# Patient Record
Sex: MALE | Race: OTHER | HISPANIC OR LATINO | URBAN - METROPOLITAN AREA
[De-identification: names, ages, dates, MRNs, and addresses within clinical notes are randomized per-mention and may not be internally consistent; named-entity substitution may affect disease eponyms.]

---

## 2018-02-10 ENCOUNTER — EMERGENCY (EMERGENCY)
Facility: HOSPITAL | Age: 33
LOS: 1 days | Discharge: HOME | End: 2018-02-10
Attending: EMERGENCY MEDICINE | Admitting: EMERGENCY MEDICINE
Payer: MEDICAID

## 2018-02-10 VITALS
WEIGHT: 179.9 LBS | OXYGEN SATURATION: 96 % | SYSTOLIC BLOOD PRESSURE: 131 MMHG | HEART RATE: 101 BPM | DIASTOLIC BLOOD PRESSURE: 75 MMHG | TEMPERATURE: 101 F | HEIGHT: 64 IN | RESPIRATION RATE: 20 BRPM

## 2018-02-10 VITALS
OXYGEN SATURATION: 99 % | SYSTOLIC BLOOD PRESSURE: 120 MMHG | RESPIRATION RATE: 18 BRPM | HEART RATE: 89 BPM | TEMPERATURE: 100 F | DIASTOLIC BLOOD PRESSURE: 71 MMHG

## 2018-02-10 DIAGNOSIS — R51 HEADACHE: ICD-10-CM

## 2018-02-10 DIAGNOSIS — R11.2 NAUSEA WITH VOMITING, UNSPECIFIED: ICD-10-CM

## 2018-02-10 DIAGNOSIS — R10.13 EPIGASTRIC PAIN: ICD-10-CM

## 2018-02-10 DIAGNOSIS — R10.9 UNSPECIFIED ABDOMINAL PAIN: ICD-10-CM

## 2018-02-10 LAB
ALBUMIN SERPL ELPH-MCNC: 4.4 G/DL — SIGNIFICANT CHANGE UP (ref 3.3–5)
ALP SERPL-CCNC: 90 U/L — SIGNIFICANT CHANGE UP (ref 40–120)
ALT FLD-CCNC: 35 U/L — SIGNIFICANT CHANGE UP (ref 10–45)
ANION GAP SERPL CALC-SCNC: 13 MMOL/L — SIGNIFICANT CHANGE UP (ref 5–17)
APPEARANCE UR: CLEAR — SIGNIFICANT CHANGE UP
APTT BLD: 31.6 SEC — SIGNIFICANT CHANGE UP (ref 27.5–37.4)
AST SERPL-CCNC: 40 U/L — SIGNIFICANT CHANGE UP (ref 10–40)
BACTERIA # UR AUTO: PRESENT /HPF
BASOPHILS NFR BLD AUTO: 0.1 % — SIGNIFICANT CHANGE UP (ref 0–2)
BILIRUB SERPL-MCNC: 0.9 MG/DL — SIGNIFICANT CHANGE UP (ref 0.2–1.2)
BILIRUB UR-MCNC: NEGATIVE — SIGNIFICANT CHANGE UP
BUN SERPL-MCNC: 13 MG/DL — SIGNIFICANT CHANGE UP (ref 7–23)
CALCIUM SERPL-MCNC: 9 MG/DL — SIGNIFICANT CHANGE UP (ref 8.4–10.5)
CHLORIDE SERPL-SCNC: 99 MMOL/L — SIGNIFICANT CHANGE UP (ref 96–108)
CO2 SERPL-SCNC: 23 MMOL/L — SIGNIFICANT CHANGE UP (ref 22–31)
COLOR SPEC: YELLOW — SIGNIFICANT CHANGE UP
CREAT SERPL-MCNC: 1.06 MG/DL — SIGNIFICANT CHANGE UP (ref 0.5–1.3)
DIFF PNL FLD: (no result)
GLUCOSE SERPL-MCNC: 148 MG/DL — HIGH (ref 70–99)
GLUCOSE UR QL: NEGATIVE — SIGNIFICANT CHANGE UP
HCT VFR BLD CALC: 42.3 % — SIGNIFICANT CHANGE UP (ref 39–50)
HGB BLD-MCNC: 15 G/DL — SIGNIFICANT CHANGE UP (ref 13–17)
INR BLD: 1.26 — HIGH (ref 0.88–1.16)
KETONES UR-MCNC: NEGATIVE — SIGNIFICANT CHANGE UP
LACTATE SERPL-SCNC: 1 MMOL/L — SIGNIFICANT CHANGE UP (ref 0.5–2)
LEUKOCYTE ESTERASE UR-ACNC: NEGATIVE — SIGNIFICANT CHANGE UP
LIDOCAIN IGE QN: 18 U/L — SIGNIFICANT CHANGE UP (ref 7–60)
LYMPHOCYTES # BLD AUTO: 4.8 % — LOW (ref 13–44)
MCHC RBC-ENTMCNC: 31.8 PG — SIGNIFICANT CHANGE UP (ref 27–34)
MCHC RBC-ENTMCNC: 35.5 G/DL — SIGNIFICANT CHANGE UP (ref 32–36)
MCV RBC AUTO: 89.8 FL — SIGNIFICANT CHANGE UP (ref 80–100)
MONOCYTES NFR BLD AUTO: 2.8 % — SIGNIFICANT CHANGE UP (ref 2–14)
NEUTROPHILS NFR BLD AUTO: 92.3 % — HIGH (ref 43–77)
NITRITE UR-MCNC: NEGATIVE — SIGNIFICANT CHANGE UP
PH UR: 7 — SIGNIFICANT CHANGE UP (ref 5–8)
PLATELET # BLD AUTO: 185 K/UL — SIGNIFICANT CHANGE UP (ref 150–400)
POTASSIUM SERPL-MCNC: 3.7 MMOL/L — SIGNIFICANT CHANGE UP (ref 3.5–5.3)
POTASSIUM SERPL-SCNC: 3.7 MMOL/L — SIGNIFICANT CHANGE UP (ref 3.5–5.3)
PROT SERPL-MCNC: 7.9 G/DL — SIGNIFICANT CHANGE UP (ref 6–8.3)
PROT UR-MCNC: 30 MG/DL
PROTHROM AB SERPL-ACNC: 14 SEC — HIGH (ref 9.8–12.7)
RBC # BLD: 4.71 M/UL — SIGNIFICANT CHANGE UP (ref 4.2–5.8)
RBC # FLD: 12.8 % — SIGNIFICANT CHANGE UP (ref 10.3–16.9)
RBC CASTS # UR COMP ASSIST: (no result) /HPF
SODIUM SERPL-SCNC: 135 MMOL/L — SIGNIFICANT CHANGE UP (ref 135–145)
SP GR SPEC: 1.01 — SIGNIFICANT CHANGE UP (ref 1–1.03)
UROBILINOGEN FLD QL: 1 E.U./DL — SIGNIFICANT CHANGE UP
WBC # BLD: 17.4 K/UL — HIGH (ref 3.8–10.5)
WBC # FLD AUTO: 17.4 K/UL — HIGH (ref 3.8–10.5)
WBC UR QL: < 5 /HPF — SIGNIFICANT CHANGE UP

## 2018-02-10 PROCEDURE — 71046 X-RAY EXAM CHEST 2 VIEWS: CPT

## 2018-02-10 PROCEDURE — 99285 EMERGENCY DEPT VISIT HI MDM: CPT

## 2018-02-10 PROCEDURE — 83690 ASSAY OF LIPASE: CPT

## 2018-02-10 PROCEDURE — 81001 URINALYSIS AUTO W/SCOPE: CPT

## 2018-02-10 PROCEDURE — 85730 THROMBOPLASTIN TIME PARTIAL: CPT

## 2018-02-10 PROCEDURE — 83605 ASSAY OF LACTIC ACID: CPT

## 2018-02-10 PROCEDURE — 99284 EMERGENCY DEPT VISIT MOD MDM: CPT | Mod: 25

## 2018-02-10 PROCEDURE — 71046 X-RAY EXAM CHEST 2 VIEWS: CPT | Mod: 26

## 2018-02-10 PROCEDURE — 36415 COLL VENOUS BLD VENIPUNCTURE: CPT

## 2018-02-10 PROCEDURE — 87040 BLOOD CULTURE FOR BACTERIA: CPT

## 2018-02-10 PROCEDURE — 76705 ECHO EXAM OF ABDOMEN: CPT

## 2018-02-10 PROCEDURE — 85025 COMPLETE CBC W/AUTO DIFF WBC: CPT

## 2018-02-10 PROCEDURE — 74177 CT ABD & PELVIS W/CONTRAST: CPT | Mod: 26

## 2018-02-10 PROCEDURE — 80053 COMPREHEN METABOLIC PANEL: CPT

## 2018-02-10 PROCEDURE — 74177 CT ABD & PELVIS W/CONTRAST: CPT

## 2018-02-10 PROCEDURE — 96375 TX/PRO/DX INJ NEW DRUG ADDON: CPT

## 2018-02-10 PROCEDURE — 85610 PROTHROMBIN TIME: CPT

## 2018-02-10 PROCEDURE — 76705 ECHO EXAM OF ABDOMEN: CPT | Mod: 26

## 2018-02-10 PROCEDURE — 96374 THER/PROPH/DIAG INJ IV PUSH: CPT | Mod: XU

## 2018-02-10 PROCEDURE — 87086 URINE CULTURE/COLONY COUNT: CPT

## 2018-02-10 RX ORDER — MORPHINE SULFATE 50 MG/1
2 CAPSULE, EXTENDED RELEASE ORAL ONCE
Qty: 0 | Refills: 0 | Status: DISCONTINUED | OUTPATIENT
Start: 2018-02-10 | End: 2018-02-10

## 2018-02-10 RX ORDER — IBUPROFEN 200 MG
1 TABLET ORAL
Qty: 30 | Refills: 0
Start: 2018-02-10 | End: 2018-02-19

## 2018-02-10 RX ORDER — IOHEXOL 300 MG/ML
50 INJECTION, SOLUTION INTRAVENOUS ONCE
Qty: 0 | Refills: 0 | Status: COMPLETED | OUTPATIENT
Start: 2018-02-10 | End: 2018-02-10

## 2018-02-10 RX ORDER — SODIUM CHLORIDE 9 MG/ML
1000 INJECTION INTRAMUSCULAR; INTRAVENOUS; SUBCUTANEOUS ONCE
Qty: 0 | Refills: 0 | Status: COMPLETED | OUTPATIENT
Start: 2018-02-10 | End: 2018-02-10

## 2018-02-10 RX ORDER — ONDANSETRON 8 MG/1
4 TABLET, FILM COATED ORAL ONCE
Qty: 0 | Refills: 0 | Status: COMPLETED | OUTPATIENT
Start: 2018-02-10 | End: 2018-02-10

## 2018-02-10 RX ADMIN — ONDANSETRON 4 MILLIGRAM(S): 8 TABLET, FILM COATED ORAL at 09:16

## 2018-02-10 RX ADMIN — MORPHINE SULFATE 2 MILLIGRAM(S): 50 CAPSULE, EXTENDED RELEASE ORAL at 09:17

## 2018-02-10 RX ADMIN — SODIUM CHLORIDE 2000 MILLILITER(S): 9 INJECTION INTRAMUSCULAR; INTRAVENOUS; SUBCUTANEOUS at 09:14

## 2018-02-10 RX ADMIN — SODIUM CHLORIDE 2000 MILLILITER(S): 9 INJECTION INTRAMUSCULAR; INTRAVENOUS; SUBCUTANEOUS at 09:49

## 2018-02-10 RX ADMIN — MORPHINE SULFATE 2 MILLIGRAM(S): 50 CAPSULE, EXTENDED RELEASE ORAL at 09:35

## 2018-02-10 RX ADMIN — IOHEXOL 50 MILLILITER(S): 300 INJECTION, SOLUTION INTRAVENOUS at 12:23

## 2018-02-10 NOTE — ED PROVIDER NOTE - MEDICAL DECISION MAKING DETAILS
abd pain, n/v and ha ? viral. temp 100.8 in ED. neuro exam intact. labs noted. elevagted WBC. u/s done and no acute pathology. lactic 1.0. fever improved with fluids. ct scan done and no acute pathology. fluids, rest, motrin and f/u with pmd

## 2018-02-10 NOTE — ED ADULT NURSE NOTE - OBJECTIVE STATEMENT
Pt presents to ED A&Ox3 c/o mid adnominal pain, back pain and headache since last night. pt reports 1 episode of vomiting today and subjective fever. denies diarrhea, dysuria, burning urination, cp/sob.

## 2018-02-10 NOTE — ED ADULT NURSE NOTE - CHPI ED SYMPTOMS NEG
no diarrhea/no chills/no hematuria/no dysuria/no blood in stool/no burning urination/no abdominal distension

## 2018-02-10 NOTE — ED PROVIDER NOTE - GASTROINTESTINAL, MLM
Abdomen soft, mild tenderness to epigastric region. no guarding. no rebound. no cva tenderness. no distention.

## 2018-02-10 NOTE — ED PROVIDER NOTE - OBJECTIVE STATEMENT
31 y/o male with no sig PMHx c/o abd pain, n/v and ha. pt states sx's began last night. pt notes fever upon arrival.  Pt states vomited x 1 in cab ride to hospital. no visual changes. no neck or back pain. no sore throat, cough. no diarrhea. pt notes pain to upper admomen dull achy constant pain and radiates to back. no urinary sx's. no recent travel. no further complaints.

## 2018-02-10 NOTE — ED ADULT TRIAGE NOTE - CHIEF COMPLAINT QUOTE
Patient c/o abdominal pain , nausea ,vomiting and lower back pain since last night . Denies any dysuria .

## 2018-02-10 NOTE — ED PROVIDER NOTE - ATTENDING CONTRIBUTION TO CARE
31 y/o male with no sig PMHx c/o abd pain, n/v and ha. pt states sx's began last night. pt notes fever upon arrival.  Pt states vomited x 1 in cab ride to hospital. no visual changes. no neck or back pain. no sore throat, cough. no diarrhea. pt notes pain to upper admomen dull achy constant pain and radiates to back. no urinary sx's. no recent travel. no further complaints.  PE mild diffuse tenderness, labs and ct done, febrile to 100.8, elevated wbc count, us neg, ct no acute pathology most likely viral syndrome causing symptoms, fever, elevated wbc count - recommend supportive care.

## 2018-02-11 LAB
CULTURE RESULTS: SIGNIFICANT CHANGE UP
SPECIMEN SOURCE: SIGNIFICANT CHANGE UP

## 2018-02-15 LAB
CULTURE RESULTS: SIGNIFICANT CHANGE UP
CULTURE RESULTS: SIGNIFICANT CHANGE UP
SPECIMEN SOURCE: SIGNIFICANT CHANGE UP
SPECIMEN SOURCE: SIGNIFICANT CHANGE UP

## 2019-02-25 ENCOUNTER — EMERGENCY (EMERGENCY)
Facility: HOSPITAL | Age: 34
LOS: 1 days | Discharge: HOME | End: 2019-02-25
Admitting: EMERGENCY MEDICINE
Payer: MEDICAID

## 2019-02-25 VITALS
WEIGHT: 184.97 LBS | OXYGEN SATURATION: 97 % | RESPIRATION RATE: 17 BRPM | SYSTOLIC BLOOD PRESSURE: 134 MMHG | DIASTOLIC BLOOD PRESSURE: 91 MMHG | HEART RATE: 68 BPM | TEMPERATURE: 98 F

## 2019-02-25 DIAGNOSIS — I83.008 VARICOSE VEINS OF UNSPECIFIED LOWER EXTREMITY WITH ULCER OTHER PART OF LOWER LEG: ICD-10-CM

## 2019-02-25 DIAGNOSIS — Z79.1 LONG TERM (CURRENT) USE OF NON-STEROIDAL ANTI-INFLAMMATORIES (NSAID): ICD-10-CM

## 2019-02-25 DIAGNOSIS — Z79.899 OTHER LONG TERM (CURRENT) DRUG THERAPY: ICD-10-CM

## 2019-02-25 DIAGNOSIS — M79.662 PAIN IN LEFT LOWER LEG: ICD-10-CM

## 2019-02-25 PROCEDURE — 99284 EMERGENCY DEPT VISIT MOD MDM: CPT

## 2019-02-25 PROCEDURE — 93971 EXTREMITY STUDY: CPT | Mod: 26,LT

## 2019-02-25 PROCEDURE — 93971 EXTREMITY STUDY: CPT

## 2019-02-25 NOTE — ED ADULT NURSE NOTE - CHPI ED NUR SYMPTOMS NEG
no vomiting/no fever/no drainage/no chills/no bleeding at site/no blood in mucus/no purulent drainage/no rectal pain/no redness

## 2019-02-25 NOTE — ED PROVIDER NOTE - NSFOLLOWUPINSTRUCTIONS_ED_ALL_ED_FT
Use la crema antifúngica dos veces al día kasey 2 semanas.    Eleve christine piernas siempre que sea posible.  Use medias de compresión mientras trabaja para ayudar a mejorar la circulación en las piernas.    Seguimiento en la [facility]. Si tiene alguna dificultad para obtener kitty izabella, llame al Coordinador de referencias de ED [phone] o puede hacer un seguimiento en kitty clínica de bladimir y hospitales de la [facility].  __________________________________________________________  Úlcera varicosa  Venous Ulcer  Kitty úlcera varicosa es kitty llaga superficial en la parte baja de la pierna cuya causa es la awilda circulación venosa. Esta afección suele conocerse paula úlcera por insuficiencia venosa.    Las venas tienen válvulas que ayudan a que la erica retorne al corazón. Si estas válvulas no funcionan correctamente, el flujo sanguíneo puede retroceder y regresar a las venas cerca de la piel. Cuando esto ocurre, la erica puede depositarse en la parte baja de las piernas. Luego, la erica puede escaparse de las venas e irritar la piel. [place] puede causar kitty lesión en la piel que se convierte en kitty úlcera varicosa.    ImageLas úlceras varicosas son el tipo más frecuente de úlceras de la parte inferior de la pierna. Estas pueden aparecer en kitty o en ambas piernas. La jennifer donde esta afección se manifiesta más comúnmente es alrededor de los tobillos. Kitty úlcera varicosa puede durar mucho tiempo (úlcera crónica) o reaparecer reiteradamente (úlcera recurrente).    ¿Cuáles son las causas?  Cualquier trastorno que cause awilda circulación en las piernas puede derivar en la formación de kitty úlcera varicosa.    ¿Qué incrementa el riesgo?  Es más probable que esta afección se manifieste en:    Las personas mayores de 65 años.  Las personas con sobrepeso.  Las personas sedentarias.  Las personas que ya tuvieron kitty úlcera en la pierna.  Las personas que tienen coágulos de erica en las venas en la parte inferior de las piernas (trombosis venosa profunda).  Las personas a las que se les inflaman las venas de las piernas (flebitis).  Las mujeres que tuvieron un hijo.  Las personas que fuman.    ¿Cuáles son los signos o los síntomas?  El síntoma más frecuente de esta afección es kitty llaga abierta cerca del tobillo. Otros síntomas pueden incluir lo siguiente:    Hinchazón.  Engrosamiento de la piel.  Líquido que supura de la úlcera.  Hemorragia.  Picazón.  Dolor e hinchazón que empeoran al estar de pie y que se alivian al elevar la pierna.  Piel manchada.  Oscurecimiento de la piel.    ¿Cómo se diagnostica?  El médico puede sospechar la presencia de kitty úlcera varicosa en función de la historia clínica y de los factores de riesgo. El médico le revisará la piel de las piernas. Se pueden hacer otros estudios para obtener más información sobre la úlcera y determinar la mejor forma de tratarla. Podrán solicitarle otros estudios, por ejemplo:    Determinación de la presión arterial en los brazos y las piernas.  Utilización de ondas de kevin (ecografía) para medir el flujo sanguíneo en las venas de las piernas.    ¿Cómo se trata?  Es posible que tenga que probar varios tipos de tratamientos diferentes para lograr la cicatrización de la úlcera varicosa. La cicatrización puede demorar mucho tiempo. El tratamiento puede incluir lo siguiente:    Mantener la pierna levantada elevada.  Usar un tipo de vendaje o de medias para comprimir las venas de las piernas (tratamiento de compresión). Es poco probable que las heridas varicosas se cicatricen o se mantengan cerradas sin compresión.  Jignesh medicamentos para mejorar el flujo sanguíneo.  Jignesh antibióticos para tratar la infección.  Limpiar la úlcera y eliminar el tejido muerto de la herida (limpieza quirúrgica).  Colocar varios tipos de vendas (vendajes) medicinales en la úlcera. [place] ayuda a que la úlcera cicatrice y a evitar las infecciones.    A veces, es necesario someterse a kitty cirugía para cerrar la herida con un trozo de piel que se paulo de otra parte del cuerpo (injerto). Si otros tratamientos no resultan eficaces o si la úlcera es muy profunda, costa vez tenga que someterse a kitty cirugía.    Siga estas indicaciones en madera casa:  Cuidado de la herida     Siga las indicaciones del médico acerca de lo siguiente:    Cómo cuidar de la herida.  Cómo y cuándo cambiar las vendas (vendaje).  Cuándo retirar el vendaje. Si el vendaje está seco y adherido a la pierna cuando intenta quitarlo, humedézcalo o mójelo con solución salina o agua, de modo que se pueda retirar sin dañar la piel o el tejido de la herida.    Controle la herida todos los días para detectar signos de infección. Pídale a un médico que lo evelio si no puede hacerlo usted mismo. Esté atento a los siguientes signos:    Aumento del enrojecimiento, la hinchazón o el dolor.  Mayor presencia de líquido o erica.  Pus, sensación de calor o mal olor.    Medicamentos     [place] los medicamentos de venta kaylen y los recetados solamente paula se lo haya indicado el médico.  Si le recetaron un antibiótico, tómelo o aplíqueselo paula se lo haya indicado el médico. No deje de jignesh o de usar el antibiótico aunque la afección mejore.  Actividad     No permanezca de pie o sentado en kitty misma posición kasey mucho tiempo. Descanse con las piernas levantadas kasey el día. En lo posible, eleve la pierna por arriba del nivel del corazón, kasey 30 minutos, 3 a 4 veces por día.  No se siente con las piernas cruzadas.  Camine con frecuencia para aumentar el flujo de erica a las piernas. Pregúntele al médico qué nivel de actividad es seguro para usted.  Si realiza un viaje sunil en automóvil o en avión, párese y camine por lo menos kitty vez cada dos horas o con la frecuencia que le recomiende el médico. Pregúntele al médico si debe jignesh aspirinas antes de realizar viajes largos.  Instrucciones generales     Use medias elásticas, medias de compresión o medias de descanso paula se lo haya indicado el médico. [place] es muy importante.  Eleve el pie de la cama paula se lo haya indicado el médico.  No fume.  ImageConcurra a todas las visitas de seguimiento paula se lo haya indicado el médico. [place] es importante.  Comuníquese con un médico si:  Tiene fiebre.  La úlcera se está agrandando o no cicatriza.  El dolor empeora.  Aumenta el enrojecimiento o la hinchazón alrededor de la úlcera.  Emana más líquido, erica o pus de la úlcera después de que el médico la haya limpiado.  Tiene sensación de calor en la úlcera o esta tiene mal olor.  Esta información no tiene paula fin reemplazar el consejo del médico. Asegúrese de hacerle al médico cualquier pregunta que tenga.

## 2019-02-25 NOTE — ED PROVIDER NOTE - NS ED ROS FT
CONST:  no fever/chills  NEURO: no tingling/numbness/weakness  CARDIO: no chest pain/palpitations  PULM: no dyspnea/cough/hemoptysis  GI: no abdominal pain, nausea or vomiting

## 2019-02-25 NOTE — ED PROVIDER NOTE - CLINICAL SUMMARY MEDICAL DECISION MAKING FREE TEXT BOX
Suspect venous stasis ulcer of LLE due to long hours standing at work.  Consider element of fungal infection, but no cellulitis.  Low clinical suspicion for DVT, and US negative for DVT.  Will tx with antifungal, leg elevation and compression stockings.  Primary care clinic follow up.

## 2019-02-25 NOTE — ED ADULT NURSE NOTE - OBJECTIVE STATEMENT
Pt w/o significant PMH presents to ED today c/o 5/10 LLE pain x1 month.  Pt states pain is worse at night and when standing.  Pt has wound on medial malleolus of LLE that is scabbed over that he elicits presented x1 week ago.  Pt denies injury, fall, N/V, fever/chills or redness/itching at wound site.  Pt states he has been treating his pain w/ PO tylenol w/o success.  Pt denies SOB or long air/train travel, but does elicit he stands approx 6-8 at a time at work.  Pt is pending eval by [facility].

## 2019-02-25 NOTE — ED PROVIDER NOTE - NSFOLLOWUPCLINICS_GEN_ALL_ED_FT
[facility]  Family Medicine  [address] [place] [address]  Phone: [phone]  Fax:     [address]  Family Medicine  [address] [place] [address]  Phone: [phone]  Fax: [phone]  Follow Up Time:

## 2019-02-25 NOTE — ED PROVIDER NOTE - PHYSICAL EXAMINATION
CONSTITUTIONAL: WD,WN. NAD.    SKIN: Normal color and turgor. No rash.    HEAD: NC/AT.  EYES: Conjunctiva clear. EOMI. PERRL.    ENT: Airway patent, OP without erythema, tonsillar swelling or exudate; uvula midline without swelling. Nasal mucosa clear, no rhinorrhea.   RESPIRATORY:  Breathing non-labored. No retractions or accessory muscle use.  Lungs CTA bilat.  CARDIOVASCULAR:  RRR, S1S2. No M/R/G.      GI:  Abdomen soft, nontender.    MSK: 2 x 2 cm eschar to anteromedial aspect of right lower leg above the ankle; second 1 cm lesion to anterior lower leg a few cm away.  Surrounding brawny discoloration; no erythema/warmth.  Strong DP and PT pulses. No joint swelling or ROM limitation.  NEURO: Alert and oriented; CN II-XII grossly intact. Speech clear. 5/5 strength in all extremities.  Normal balance and gait.

## 2019-02-25 NOTE — ED ADULT NURSE NOTE - NSIMPLEMENTINTERV_GEN_ALL_ED
Implemented All Universal Safety Interventions:  [place] to call system. Call bell, personal items and telephone within reach. Instruct patient to call for assistance. Room bathroom lighting operational. Non-slip footwear when patient is off stretcher. Physically safe environment: no spills, clutter or unnecessary equipment. Stretcher in lowest position, wheels locked, appropriate side rails in place.

## 2019-02-25 NOTE — ED PROVIDER NOTE - OBJECTIVE STATEMENT
pt is 32 yo m without significant PMHx c/o painful skin lesion to left lower leg for the past month.  He says he is on his feet multiple hours daily at work.  Pain increases with walking and associated with mild swelling.  He tried compression socks for a week without relief.  No hx of DVT/PE.  No immobilization, no hemptysis/cough/CP/palpitations. No fever.    PCP: none  PMHx none  PSHx none  Meds none  NKA

## 2019-03-01 PROBLEM — Z00.00 ENCOUNTER FOR PREVENTIVE HEALTH EXAMINATION: Status: ACTIVE | Noted: 2019-03-01

## 2019-03-14 ENCOUNTER — APPOINTMENT (OUTPATIENT)
Dept: INTERNAL MEDICINE | Facility: CLINIC | Age: 34
End: 2019-03-14

## 2019-03-18 ENCOUNTER — EMERGENCY (EMERGENCY)
Facility: HOSPITAL | Age: 34
LOS: 1 days | Discharge: HOME | End: 2019-03-18
Admitting: EMERGENCY MEDICINE
Payer: MEDICAID

## 2019-03-18 VITALS
SYSTOLIC BLOOD PRESSURE: 143 MMHG | HEART RATE: 79 BPM | TEMPERATURE: 98 F | OXYGEN SATURATION: 98 % | DIASTOLIC BLOOD PRESSURE: 91 MMHG | RESPIRATION RATE: 16 BRPM

## 2019-03-18 LAB
ALBUMIN SERPL ELPH-MCNC: 4.2 G/DL — SIGNIFICANT CHANGE UP (ref 3.3–5)
ALP SERPL-CCNC: 91 U/L — SIGNIFICANT CHANGE UP (ref 40–120)
ALT FLD-CCNC: 25 U/L — SIGNIFICANT CHANGE UP (ref 10–45)
ANION GAP SERPL CALC-SCNC: 11 MMOL/L — SIGNIFICANT CHANGE UP (ref 5–17)
APTT BLD: 39.1 SEC — HIGH (ref 27.5–36.3)
AST SERPL-CCNC: 22 U/L — SIGNIFICANT CHANGE UP (ref 10–40)
BASOPHILS # BLD AUTO: 0.03 K/UL — SIGNIFICANT CHANGE UP (ref 0–0.2)
BASOPHILS NFR BLD AUTO: 0.4 % — SIGNIFICANT CHANGE UP (ref 0–2)
BILIRUB SERPL-MCNC: 0.2 MG/DL — SIGNIFICANT CHANGE UP (ref 0.2–1.2)
BUN SERPL-MCNC: 19 MG/DL — SIGNIFICANT CHANGE UP (ref 7–23)
CALCIUM SERPL-MCNC: 9.3 MG/DL — SIGNIFICANT CHANGE UP (ref 8.4–10.5)
CHLORIDE SERPL-SCNC: 107 MMOL/L — SIGNIFICANT CHANGE UP (ref 96–108)
CO2 SERPL-SCNC: 23 MMOL/L — SIGNIFICANT CHANGE UP (ref 22–31)
CREAT SERPL-MCNC: 0.98 MG/DL — SIGNIFICANT CHANGE UP (ref 0.5–1.3)
EOSINOPHIL # BLD AUTO: 0.12 K/UL — SIGNIFICANT CHANGE UP (ref 0–0.5)
EOSINOPHIL NFR BLD AUTO: 1.7 % — SIGNIFICANT CHANGE UP (ref 0–6)
GLUCOSE SERPL-MCNC: 100 MG/DL — HIGH (ref 70–99)
HCT VFR BLD CALC: 43 % — SIGNIFICANT CHANGE UP (ref 39–50)
HGB BLD-MCNC: 15.6 G/DL — SIGNIFICANT CHANGE UP (ref 13–17)
IMM GRANULOCYTES NFR BLD AUTO: 0.1 % — SIGNIFICANT CHANGE UP (ref 0–1.5)
INR BLD: 1.04 — SIGNIFICANT CHANGE UP (ref 0.88–1.16)
LYMPHOCYTES # BLD AUTO: 2.8 K/UL — SIGNIFICANT CHANGE UP (ref 1–3.3)
LYMPHOCYTES # BLD AUTO: 38.6 % — SIGNIFICANT CHANGE UP (ref 13–44)
MCHC RBC-ENTMCNC: 33.3 PG — SIGNIFICANT CHANGE UP (ref 27–34)
MCHC RBC-ENTMCNC: 36.3 GM/DL — HIGH (ref 32–36)
MCV RBC AUTO: 91.9 FL — SIGNIFICANT CHANGE UP (ref 80–100)
MONOCYTES # BLD AUTO: 0.46 K/UL — SIGNIFICANT CHANGE UP (ref 0–0.9)
MONOCYTES NFR BLD AUTO: 6.3 % — SIGNIFICANT CHANGE UP (ref 2–14)
NEUTROPHILS # BLD AUTO: 3.84 K/UL — SIGNIFICANT CHANGE UP (ref 1.8–7.4)
NEUTROPHILS NFR BLD AUTO: 52.9 % — SIGNIFICANT CHANGE UP (ref 43–77)
NRBC # BLD: 0 /100 WBCS — SIGNIFICANT CHANGE UP (ref 0–0)
PLATELET # BLD AUTO: 196 K/UL — SIGNIFICANT CHANGE UP (ref 150–400)
POTASSIUM SERPL-MCNC: 3.8 MMOL/L — SIGNIFICANT CHANGE UP (ref 3.5–5.3)
POTASSIUM SERPL-SCNC: 3.8 MMOL/L — SIGNIFICANT CHANGE UP (ref 3.5–5.3)
PROT SERPL-MCNC: 7.7 G/DL — SIGNIFICANT CHANGE UP (ref 6–8.3)
PROTHROM AB SERPL-ACNC: 11.8 SEC — SIGNIFICANT CHANGE UP (ref 10–12.9)
RBC # BLD: 4.68 M/UL — SIGNIFICANT CHANGE UP (ref 4.2–5.8)
RBC # FLD: 12.4 % — SIGNIFICANT CHANGE UP (ref 10.3–14.5)
SODIUM SERPL-SCNC: 141 MMOL/L — SIGNIFICANT CHANGE UP (ref 135–145)
WBC # BLD: 7.26 K/UL — SIGNIFICANT CHANGE UP (ref 3.8–10.5)
WBC # FLD AUTO: 7.26 K/UL — SIGNIFICANT CHANGE UP (ref 3.8–10.5)

## 2019-03-18 PROCEDURE — 80053 COMPREHEN METABOLIC PANEL: CPT

## 2019-03-18 PROCEDURE — 99284 EMERGENCY DEPT VISIT MOD MDM: CPT

## 2019-03-18 PROCEDURE — 99284 EMERGENCY DEPT VISIT MOD MDM: CPT | Mod: 25

## 2019-03-18 PROCEDURE — 96374 THER/PROPH/DIAG INJ IV PUSH: CPT

## 2019-03-18 PROCEDURE — 85610 PROTHROMBIN TIME: CPT

## 2019-03-18 PROCEDURE — 36415 COLL VENOUS BLD VENIPUNCTURE: CPT

## 2019-03-18 PROCEDURE — 73590 X-RAY EXAM OF LOWER LEG: CPT | Mod: 26,LT

## 2019-03-18 PROCEDURE — 85730 THROMBOPLASTIN TIME PARTIAL: CPT

## 2019-03-18 PROCEDURE — 73590 X-RAY EXAM OF LOWER LEG: CPT

## 2019-03-18 PROCEDURE — 85025 COMPLETE CBC W/AUTO DIFF WBC: CPT

## 2019-03-18 RX ORDER — CEPHALEXIN 500 MG
1 CAPSULE ORAL
Qty: 20 | Refills: 0
Start: 2019-03-18 | End: 2019-03-27

## 2019-03-18 RX ORDER — VANCOMYCIN HCL 1 G
1000 VIAL (EA) INTRAVENOUS ONCE
Qty: 0 | Refills: 0 | Status: COMPLETED | OUTPATIENT
Start: 2019-03-18 | End: 2019-03-18

## 2019-03-18 RX ORDER — DIPHENHYDRAMINE HCL 50 MG
50 CAPSULE ORAL ONCE
Qty: 0 | Refills: 0 | Status: COMPLETED | OUTPATIENT
Start: 2019-03-18 | End: 2019-03-18

## 2019-03-18 RX ADMIN — Medication 50 MILLIGRAM(S): at 20:58

## 2019-03-18 RX ADMIN — Medication 250 MILLIGRAM(S): at 19:01

## 2019-03-18 NOTE — ED ADULT NURSE NOTE - OBJECTIVE STATEMENT
The pt is a 32 y/o male who came in to ED for evaluation of wound check. Pt reports coming to ED for evaluation of same wound two weeks ago and given bacitracin which did not make it better.

## 2019-03-18 NOTE — ED PROVIDER NOTE - PHYSICAL EXAMINATION
4cm ulcer above left ankle medial aspect. no d/c. mild surrounding erythema and mild swelling. no collection palpable. FROM. distal NVI. + tenderness to ucler otherwise LLE non tender. no calf tenderness. no streaking.

## 2019-03-18 NOTE — ED PROVIDER NOTE - PROGRESS NOTE DETAILS
pt noted redness to face, upper back and upper chest at end for infusion. mild itching. no lip, tongue or throat swelling. no sob. lungs clear on exam. ? redmans syndrome vs allergic rxn. benadryl given and will observe. pt observed in ED. rash resolved and pt reports feeling well. no sob. will d/c home likely klaus's and not allergic rxn.

## 2019-03-18 NOTE — ED ADULT NURSE NOTE - NSIMPLEMENTINTERV_GEN_ALL_ED
Implemented All Universal Safety Interventions:  [place] to call system. Call bell, personal items and telephone within reach. Instruct patient to call for assistance. Room bathroom lighting operational. Non-slip footwear when patient is off stretcher. Physically safe environment: no spills, clutter or unnecessary equipment. Stretcher in lowest position, wheels locked, appropriate side rails in place.

## 2019-03-18 NOTE — ED PROVIDER NOTE - NSFOLLOWUPINSTRUCTIONS_ED_ALL_ED_FT
Follow up in the Emergency Department in 2 days for wound check        Cellulitis, Adult    Cellulitis is a skin infection. The infected area is usually red and tender. This condition occurs most often in the arms and lower legs. The infection can travel to the muscles, blood, and underlying tissue and become serious. It is very important to get treated for this condition.    What are the causes?  Cellulitis is caused by bacteria. The bacteria enter through a break in the skin, such as a cut, burn, insect bite, open sore, or crack.    What increases the risk?  This condition is more likely to occur in people who:    Have a weak defense system (immune system).  Have open wounds on the skin such as cuts, burns, bites, and scrapes. Bacteria can enter the body through these open wounds.  Are older.  Have diabetes.  Have a type of long-lasting (chronic) liver disease (cirrhosis) or kidney disease.  Use IV drugs.    What are the signs or symptoms?  Symptoms of this condition include:    Redness, streaking, or spotting on the skin.  Swollen area of the skin.  Tenderness or pain when an area of the skin is touched.  Warm skin.  Fever.  Chills.  Blisters.    How is this diagnosed?  This condition is diagnosed based on a medical history and physical exam. You may also have tests, including:    Blood tests.  Lab tests.  Imaging tests.    How is this treated?  Treatment for this condition may include:    Medicines, such as antibiotic medicines or antihistamines.  Supportive care, such as rest and application of cold or warm cloths (cold or warm compresses) to the skin.  Hospital care, if the condition is severe.    The infection usually gets better within 1–2 days of treatment.    Follow these instructions at home:  Take over-the-counter and prescription medicines only as told by your health care provider.  If you were prescribed an antibiotic medicine, take it as told by your health care provider. Do not stop taking the antibiotic even if you start to feel better.  Drink enough fluid to keep your urine clear or pale yellow.  Do not touch or rub the infected area.  Raise (elevate) the infected area above the level of your heart while you are sitting or lying down.  Apply warm or cold compresses to the affected area as told by your health care provider.  Keep all follow-up visits as told by your health care provider. This is important. These visits let your health care provider make sure a more serious infection is not developing.  Contact a health care provider if:  You have a fever.  Your symptoms do not improve within 1–2 days of starting treatment.  Your bone or joint underneath the infected area becomes painful after the skin has healed.  Your infection returns in the same area or another area.  You notice a swollen bump in the infected area.  You develop new symptoms.  You have a general ill feeling (malaise) with muscle aches and pains.  Get help right away if:  Your symptoms get worse.  You feel very sleepy.  You develop vomiting or diarrhea that persists.  You notice red streaks coming from the infected area.  Your red area gets larger or turns dark in color.  This information is not intended to replace advice given to you by your health care provider. Make sure you discuss any questions you have with your health care provider.    Document Released: 09/27/2006 Document Revised: 04/27/2017 Document Reviewed: 10/26/2016  [employer] Interactive Patient Education © 2019 [employer] Follow up in [facility] Emergency department in 2 days for wound check        Log Out.    test companyedex® CareNotes®     :  [facility]             CELLULITIS - AfterCare(R) Instructions(ER/ED)     Cellulitis    WHAT YOU NEED TO KNOW:    Cellulitis is a skin infection caused by bacteria. Cellulitis may go away on its own or you may need treatment. Your healthcare provider may draw a [place] around the outside edges of your cellulitis. If your cellulitis spreads, your healthcare provider will see it outside of the [place]. Cellulitis          DISCHARGE INSTRUCTIONS:    Call 911 if:     You have sudden trouble breathing or chest pain.        Return to the emergency department if:     Your wound gets larger and more painful.       You feel a crackling under your skin when you touch it.      You have purple dots or bumps on your skin, or you see bleeding under your skin.      You have new swelling and pain in your legs.      The red, warm, swollen area gets larger.      You see red streaks coming from the infected area.    Contact your healthcare provider if:     You have a fever.      Your fever or pain does not go away or gets worse.      The area does not get smaller after 2 days of antibiotics.      Your skin is flaking or peeling off.      You have questions or concerns about your condition or care.    Medicines:     Antibiotics help treat the bacterial infection.       NSAIDs, such as ibuprofen, help decrease swelling, pain, and fever. NSAIDs can cause stomach bleeding or kidney problems in certain people. If you take blood thinner medicine, always ask if NSAIDs are safe for you. Always read the medicine label and follow directions. Do not give these medicines to children under 6 months of age without direction from your child's healthcare provider.      Acetaminophen decreases pain and fever. It is available without a doctor's order. Ask how much to take and how often to take it. Follow directions. Read the labels of all other medicines you are using to see if they also contain acetaminophen, or ask your doctor or pharmacist. Acetaminophen can cause liver damage if not taken correctly. Do not use more than 4 grams (4,000 milligrams) total of acetaminophen in one day.       Take your medicine as directed. Contact your healthcare provider if you think your medicine is not helping or if you have side effects. Tell him or her if you are allergic to any medicine. Keep a list of the medicines, vitamins, and herbs you take. Include the amounts, and when and why you take them. Bring the list or the pill bottles to follow-up visits. Carry your medicine list with you in case of an emergency.    Self-care:     Elevate the area above the level of your heart as often as you can. This will help decrease swelling and pain. Prop the area on pillows or blankets to keep it elevated comfortably.       Clean the area daily until the wound scabs over. Gently wash the area with soap and water. Pat dry. Use dressings as directed.       Place cool or warm, wet cloths on the area as directed. Use clean cloths and clean water. Leave it on the area until the cloth is room temperature. Pat the area dry with a clean, dry cloth. The cloths may help decrease pain.     Prevent cellulitis:     Do not scratch bug bites or areas of injury. You increase your risk for cellulitis by scratching these areas.       Do not share personal items, such as towels, clothing, and razors.       Clean exercise equipment with germ-killing  before and after you use it.      Wash your hands often. Use soap and water. Wash your hands after you use the bathroom, change a child's diapers, or sneeze. Wash your hands before you prepare or eat food. Use lotion to prevent dry, cracked skin. Handwashing           Wear pressure stockings as directed. You may be told to wear the stockings if you have peripheral edema. The stockings improve blood flow and decrease swelling.      Treat athlete’s foot. This can help prevent the spread of a bacterial skin infection.    Follow up with your healthcare provider within 3 days, or as directed: Your healthcare provider will check if your cellulitis is getting better. You may need different medicine. Write down your questions so you remember to ask them during your visits.       © Copyright [employer] 2019 All illustrations and images included in CareNotes are the copyrighted property of [employer] or [employer].      back to top                      © Copyright [employer] 2019

## 2019-03-18 NOTE — ED PROVIDER NOTE - CLINICAL SUMMARY MEDICAL DECISION MAKING FREE TEXT BOX
infected ulcer to LLE. neurovascular intact. a febrile. negative dvt study 2 wks ago. labs noted. vancomycin given in ED. will tx with keflex, bactrim and f/u in 2 days for wound check

## 2019-03-18 NOTE — ED ADULT TRIAGE NOTE - CHIEF COMPLAINT QUOTE
painful L medial ankle ulcer, was seen here 3 weeks ago sent home with bacitracin, wound getting bigger per pt

## 2019-03-18 NOTE — ED PROVIDER NOTE - OBJECTIVE STATEMENT
32 y/o male with no sig PMHx c/o infected ulcer LLE x 3 days. pt states seen here 20 days ago for same. pt notes ulcer has enlarged and become painful. pt reports using topical bacitracin. no fever or chills. no d/c. no cp or sob. negative doppler for DVT on previous visit. no further complaints.

## 2019-03-21 ENCOUNTER — EMERGENCY (EMERGENCY)
Facility: HOSPITAL | Age: 34
LOS: 1 days | Discharge: HOME | End: 2019-03-21
Admitting: EMERGENCY MEDICINE
Payer: MEDICAID

## 2019-03-21 VITALS
HEART RATE: 65 BPM | WEIGHT: 184.97 LBS | SYSTOLIC BLOOD PRESSURE: 120 MMHG | RESPIRATION RATE: 16 BRPM | DIASTOLIC BLOOD PRESSURE: 84 MMHG | TEMPERATURE: 98 F | OXYGEN SATURATION: 98 %

## 2019-03-21 DIAGNOSIS — Z79.899 OTHER LONG TERM (CURRENT) DRUG THERAPY: ICD-10-CM

## 2019-03-21 DIAGNOSIS — L97.321 NON-PRESSURE CHRONIC ULCER OF LEFT ANKLE LIMITED TO BREAKDOWN OF SKIN: ICD-10-CM

## 2019-03-21 DIAGNOSIS — Z79.1 LONG TERM (CURRENT) USE OF NON-STEROIDAL ANTI-INFLAMMATORIES (NSAID): ICD-10-CM

## 2019-03-21 DIAGNOSIS — Z79.2 LONG TERM (CURRENT) USE OF ANTIBIOTICS: ICD-10-CM

## 2019-03-21 PROCEDURE — 99282 EMERGENCY DEPT VISIT SF MDM: CPT

## 2019-03-21 NOTE — ED PROVIDER NOTE - NSFOLLOWUPINSTRUCTIONS_ED_ALL_ED_FT
Return to ED in 2 days for wound check. Continue antibiotics.     Cellulitis    Cellulitis is a skin infection caused by bacteria. This condition occurs most often in the arms and lower legs but can occur anywhere over the body. Symptoms include redness, swelling, warm skin, tenderness, and chills/fever. If you were prescribed an antibiotic medicine, take it as told by your health care provider. Do not stop taking the antibiotic even if you start to feel better.    SEEK IMMEDIATE MEDICAL CARE IF YOU HAVE ANY OF THE FOLLOWING SYMPTOMS: worsening fever, red streaks coming from affected area, vomiting or diarrhea, or dizziness/lightheadedness.

## 2019-03-21 NOTE — ED ADULT NURSE NOTE - OBJECTIVE STATEMENT
Patient reports that he was seen here on Monday and was given prescription medications but continues to worry because the wound is still open, swollen, and continues to have pain.  He verbalized that he did not clearly understand the discharge instructions and has been waking in the middle of the night with pain, was not sure if it was safe to take the Advil with the antibiotics.  Patient given proper education on continuing PO abx therapy and taking Advil daily for pain, and continue to keep it elevated as much as possible.  PA jil [place] around wound site, instructed to return if wound opens or redness extends outside of the area.  Verbalized understanding to teaching with verbal checkback.  Patient reports works at [employer] standing all day, instructed importance of keeping wound clean and dry.  Plan for discharge w follow up in 2 days.  Patient reports cannot come back until Monday.

## 2019-03-21 NOTE — ED PROVIDER NOTE - CLINICAL SUMMARY MEDICAL DECISION MAKING FREE TEXT BOX
34 yo M with no pmh here for wound check for infection LLL ulcer. Pt on bactrim and keflex. Pt reports ulcer looks the same. Denies worsening of symptoms. Reports pain and night. Denies fever, chills, discharge. Pt reports swelling unchanged to ankle. 4cm ulcer above left ankle medial aspect; mild surrounding erythema and mild swelling. no collection palpable. No discharge. FROM. distal NVI. + tenderness to ucler otherwise LLE non tender. no calf tenderness. no streaking. Area marked with pen. Advised continue abx, motrin for pain, return in 2 days for wound check.

## 2019-03-21 NOTE — ED PROVIDER NOTE - PHYSICAL EXAMINATION
CONSTITUTIONAL: Well-appearing; well-nourished; in no apparent distress.   HEAD: Normocephalic; atraumatic.   EYES: PERRL; EOM intact; conjunctiva and sclera clear  ENT: normal nose; no rhinorrhea; normal pharynx with no erythema or lesions.   NECK: Supple; non-tender;   CARDIOVASCULAR: Normal S1, S2; no murmurs, rubs, or gallops. Regular rate and rhythm.   RESPIRATORY: Breathing easily; breath sounds clear and equal bilaterally; no wheezes, rhonchi, or rales.  MSK: FROM at all extremities, normal tone   SKIN: 4cm ulcer above left ankle medial aspect; mild surrounding erythema and mild swelling. no collection palpable. No discharge. FROM. distal NVI. + tenderness to ucler otherwise LLE non tender. no calf tenderness. no streaking.

## 2019-03-21 NOTE — ED PROVIDER NOTE - OBJECTIVE STATEMENT
34 yo M with no pmh here for wound check for infection LLL ulcer. Pt on bactrim and keflex. Pt reports ulcer looks the same. Denies worsening of symptoms. Reports pain and night. Denies fever, chills, discharge. Pt reports swelling unchanged to ankle. Pt not taking anything for pain.

## 2019-03-21 NOTE — ED ADULT NURSE NOTE - NSIMPLEMENTINTERV_GEN_ALL_ED
Implemented All Universal Safety Interventions:  [place] to call system. Call bell, personal items and telephone within reach. Instruct patient to call for assistance. Room bathroom lighting operational. Non-slip footwear when patient is off stretcher. Physically safe environment: no spills, clutter or unnecessary equipment. Stretcher in lowest position, wheels locked, appropriate side rails in place.

## 2019-03-22 DIAGNOSIS — Z79.899 OTHER LONG TERM (CURRENT) DRUG THERAPY: ICD-10-CM

## 2019-03-22 DIAGNOSIS — Z79.1 LONG TERM (CURRENT) USE OF NON-STEROIDAL ANTI-INFLAMMATORIES (NSAID): ICD-10-CM

## 2019-03-22 DIAGNOSIS — Z79.2 LONG TERM (CURRENT) USE OF ANTIBIOTICS: ICD-10-CM

## 2019-03-22 DIAGNOSIS — L97.229 NON-PRESSURE CHRONIC ULCER OF LEFT CALF WITH UNSPECIFIED SEVERITY: ICD-10-CM

## 2019-04-08 ENCOUNTER — EMERGENCY (EMERGENCY)
Facility: HOSPITAL | Age: 34
LOS: 1 days | Discharge: HOME | End: 2019-04-08
Admitting: EMERGENCY MEDICINE
Payer: MEDICAID

## 2019-04-08 VITALS
SYSTOLIC BLOOD PRESSURE: 121 MMHG | HEIGHT: 66 IN | RESPIRATION RATE: 18 BRPM | HEART RATE: 74 BPM | WEIGHT: 184.97 LBS | OXYGEN SATURATION: 97 % | TEMPERATURE: 98 F | DIASTOLIC BLOOD PRESSURE: 88 MMHG

## 2019-04-08 DIAGNOSIS — Z79.899 OTHER LONG TERM (CURRENT) DRUG THERAPY: ICD-10-CM

## 2019-04-08 DIAGNOSIS — Z48.00 ENCOUNTER FOR CHANGE OR REMOVAL OF NONSURGICAL WOUND DRESSING: ICD-10-CM

## 2019-04-08 DIAGNOSIS — Z79.1 LONG TERM (CURRENT) USE OF NON-STEROIDAL ANTI-INFLAMMATORIES (NSAID): ICD-10-CM

## 2019-04-08 DIAGNOSIS — Z79.2 LONG TERM (CURRENT) USE OF ANTIBIOTICS: ICD-10-CM

## 2019-04-08 DIAGNOSIS — B35.4 TINEA CORPORIS: ICD-10-CM

## 2019-04-08 PROCEDURE — 99283 EMERGENCY DEPT VISIT LOW MDM: CPT

## 2019-04-08 RX ORDER — IBUPROFEN 200 MG
600 TABLET ORAL ONCE
Qty: 0 | Refills: 0 | Status: COMPLETED | OUTPATIENT
Start: 2019-04-08 | End: 2019-04-08

## 2019-04-08 RX ORDER — OXYCODONE AND ACETAMINOPHEN 5; 325 MG/1; MG/1
1 TABLET ORAL ONCE
Qty: 0 | Refills: 0 | Status: DISCONTINUED | OUTPATIENT
Start: 2019-04-08 | End: 2019-04-08

## 2019-04-08 RX ADMIN — Medication 1 APPLICATION(S): at 19:04

## 2019-04-08 RX ADMIN — OXYCODONE AND ACETAMINOPHEN 1 TABLET(S): 5; 325 TABLET ORAL at 18:13

## 2019-04-08 NOTE — ED ADULT TRIAGE NOTE - CHIEF COMPLAINT QUOTE
pt here for left ankle wound check. pt states " they told me to come back to have my leg checked"  reports finishing course of antibiotics but leg still hurts.

## 2019-04-08 NOTE — ED PROVIDER NOTE - OBJECTIVE STATEMENT
32 y/o male with no PMHx who is otherwise healthy is present in the ED for a wound check. Pt states he has a wound to his left inside of his ankle that has been there for 2 months. Pt states he was prescribed creme, continued to scratch his wound and states he got an infection, therefore discontinued using the creme. He was placed on antibiotics without improvement of his infection. Pt reports continued swelling and pain to his ankle. He denies the following: fever, chills, numbness/tingling. Pt states he has been taking motrin for pain without improvement.

## 2019-04-08 NOTE — ED ADULT NURSE NOTE - OBJECTIVE STATEMENT
Pt presents to ED for wound check. Pt with approx 2 cm circular ucler with crusted blood, no active drainage, to medial L ankle, was seen x2 times here for same, reports finished most recent course of abx but pt still with pain, 8/10 at this time. Pt denies new injury, fevers/chills, drainage from wound. Pt presents in NAD speaking full sentences ambulatory through triage.

## 2019-04-08 NOTE — ED PROVIDER NOTE - CLINICAL SUMMARY MEDICAL DECISION MAKING FREE TEXT BOX
32 y/o male here for wound check with non-healing itchy rash located on left medial ankle. No overlying warmth or signs of infection. No fever, no chills. Pt reports he did not use creme that was prescribed for his fungal infection. Pt was stressed compliancy of medication and advised to strictly follow-up with dermatology. ED referral assisted with referral and pt given information for dermatology.

## 2019-04-08 NOTE — ED PROVIDER NOTE - NSFOLLOWUPINSTRUCTIONS_ED_ALL_ED_FT
It is very important that you continue to use your anti-fungal creme. Please follow up with referral given. Return to the Emergency Department if you have any new or worsening symptoms, or if you have any concerns.    Tiña corporal  (Body Ringworm)  La tiña corporal es kitty infección de la piel que suele causar kitty erupción en forma de anillos. Puede afectar cualquier jennifer de la piel. Esta afección puede propagarse fácilmente a otras personas. A la tiña corporal también se la conoce paula tinea corporis.    CAUSAS  Esta afección es causada por hongos llamados dermatofitos. Se manifiesta cuando estos hongos crecen sin control en la piel.    Es posible contagiarse la afección al tocar a kitty persona o un animal que la tiene. También, al compartir ropa, ropa de cama, toallas o cualquier otro objeto con kitty persona o kitty mascota infectada.    FACTORES DE RIESGO  Es más probable que esta afección se manifieste en:    Los atletas que suelen tener contacto piel a piel con otros atletas, paula los luchadores.  Las personas que comparten equipos y colchonetas.  Las personas con el sistema inmunitario debilitado.  SÍNTOMAS  Los síntomas de esta afección incluyen lo siguiente:    Manchas y bultos rojos elevados que causan picazón.  Manchas lanier escamosas.  Kitty erupción en forma de anillos. La erupción cutánea puede consistir en lo siguiente:    Un centro anais.  Escamas o bultos rojos en el medio.  Enrojecimiento cerca de los bordes.  Piel seca y escamosa dentro o alrededor de [place].    DIAGNÓSTICO  Generalmente, esta afección puede diagnosticarse con un examen de la piel. Se puede idalia kitty muestra de la piel de la jennifer afectada y examinarla con un microscopio para determinar si hay hongos.    TRATAMIENTO  El tratamiento de esta afección puede incluir lo siguiente:    Kitty crema o kitty pomada antimicótica.  Un champú antimicótico.  Medicamentos antimicóticos. Le pueden recetar estos productos si la tiña es grave, si reaparece o si se prolonga kasey mucho tiempo.  INSTRUCCIONES PARA EL CUIDADO EN EL HOGAR  [place] los medicamentos de venta kaylen y los recetados solamente paula se lo haya indicado el médico.  Si le indicaron kitty crema o kitty pomada antimicótica:    Úselo paula se lo haya indicado el médico.  Lave el área de la infección y séquela sukh antes de aplicar la crema o la pomada.    Si le indicaron un champú antimicótico:    Úselo paula se lo haya indicado el médico.  Deje actuar el champú en el cuerpo kasey 3 a 5 minutos antes de enjuagarlo.    Mientras tiene la erupción:    Use ropa suelta para evitar roces e irritación.  Lave o cambie las sábanas cada noche.    ImageSi madera mascota tiene la misma infección, llévela al veterinario.  PREVENCIÓN  Mantenga kitty buena higiene.  Use sandalias o zapatos en lugares públicos y duchas.  No comparta los artículos de uso personal con otras personas.  Evite tocar las manchas lanier de piel de otras personas.  No toque las mascotas que tienen zonas sin pelos.  Si lo hace, lávese las ana.  SOLICITE ATENCIÓN MÉDICA SI:  La erupción continúa diseminándose después de 7 días de tratamiento.  No se rizwan en el término de 4 semanas.  El área alrededor de la erupción se vuelve tracey, está caliente al tacto, le duele o se hincha.  Esta información no tiene paula fin reemplazar el consejo del médico. Asegúrese de hacerle al médico cualquier pregunta que tenga.

## 2019-04-10 ENCOUNTER — EMERGENCY (EMERGENCY)
Facility: HOSPITAL | Age: 34
LOS: 1 days | Discharge: HOME | End: 2019-04-10
Admitting: EMERGENCY MEDICINE
Payer: MEDICAID

## 2019-04-10 VITALS
HEART RATE: 73 BPM | OXYGEN SATURATION: 98 % | WEIGHT: 184.97 LBS | SYSTOLIC BLOOD PRESSURE: 128 MMHG | DIASTOLIC BLOOD PRESSURE: 86 MMHG | TEMPERATURE: 98 F | RESPIRATION RATE: 18 BRPM | HEIGHT: 66 IN

## 2019-04-10 DIAGNOSIS — Z79.1 LONG TERM (CURRENT) USE OF NON-STEROIDAL ANTI-INFLAMMATORIES (NSAID): ICD-10-CM

## 2019-04-10 DIAGNOSIS — I87.2 VENOUS INSUFFICIENCY (CHRONIC) (PERIPHERAL): ICD-10-CM

## 2019-04-10 DIAGNOSIS — R60.0 LOCALIZED EDEMA: ICD-10-CM

## 2019-04-10 DIAGNOSIS — L03.116 CELLULITIS OF LEFT LOWER LIMB: ICD-10-CM

## 2019-04-10 DIAGNOSIS — Z79.2 LONG TERM (CURRENT) USE OF ANTIBIOTICS: ICD-10-CM

## 2019-04-10 DIAGNOSIS — Z79.899 OTHER LONG TERM (CURRENT) DRUG THERAPY: ICD-10-CM

## 2019-04-10 PROCEDURE — 93971 EXTREMITY STUDY: CPT

## 2019-04-10 PROCEDURE — 99284 EMERGENCY DEPT VISIT MOD MDM: CPT

## 2019-04-10 PROCEDURE — 93971 EXTREMITY STUDY: CPT | Mod: 26,LT

## 2019-04-10 RX ADMIN — Medication 300 MILLIGRAM(S): at 18:12

## 2019-04-10 NOTE — ED PROVIDER NOTE - CARE PLAN
Principal Discharge DX:	Cellulitis of left lower extremity  Secondary Diagnosis:	Venous insufficiency of left lower extremity

## 2019-04-10 NOTE — ED PROVIDER NOTE - NSFOLLOWUPINSTRUCTIONS_ED_ALL_ED_FT
I have discussed the discharge plan with the patient. The patient agrees with the plan, as discussed.  The patient understands Emergency Department diagnosis is a preliminary diagnosis often based on limited information and that the patient must adhere to the follow-up plan as discussed.  The patient understands that if the symptoms worsen or if prescribed medications do not have the desired/planned effect that the patient may return to the Emergency Department at any time for further evaluation and treatment.      Insuficiencia venosa crónica  Chronic Venous Insufficiency  La insuficiencia venosa crónica, también llamada estasis venosa, es kitty enfermedad que lucas que la erica se bombee de forma eficaz a través de las venas de las piernas. La erica ya no se bombea correctamente de las piernas al corazón. La enfermedad puede ser de leve a grave. Con un tratamiento adecuado, podrá llevar kitty dilip activa.    ¿Cuáles son las causas?  La insuficiencia venosa crónica ocurre cuando las browning de las venas se estiran, debilitan o dañan, o cuando las válvulas de las venas están dañadas. Algunas causas comunes incluyen:    Hipertensión arterial en las venas (hipertensión venosa).  Aumento de la presión arterial en las venas de las piernas por pasar largos períodos sentado o de pie.  Un coágulo de erica que bloquea la circulación en kitty vena (trombosis venosa profunda o TVP).  Kitty inflamación de kitty vena (flebitis) que forma un coágulo de erica.  Tumores en la pelvis que hacen que la erica se acumule.    ¿Qué incrementa el riesgo?  Los siguientes factores pueden hacer que usted sea más propenso a tener esta afección:    Tener antecedentes familiares de esta afección.  Obesidad.  Embarazo.  No hacer la cantidad suficiente de actividad física o ejercicio (estilo de dilip sedentario).  Fumar.  Trabajos que requieren permanecer largos períodos de pie o sentado en un lugar.  Tener cierta edad. Las mujeres entre 40 y 50 años y los hombres de más de 70 años tienen kitty probabilidad mayor de desarrollar esta enfermedad.    ¿Cuáles son los signos o los síntomas?  Los síntomas de esta afección incluyen los siguientes:    Venas agrandadas, hinchadas o tortuosas (venas varicosas).  Laceración o úlceras en la piel.  Enrojecimiento o cambio de color en la piel de la parte delantera de la pierna.  Piel amarronada, suave y tirante, y con dolor por arriba del tobillo, generalmente sobre la parte interna de la pierna (lipodermatosclerosis).  Hinchazón.    ¿Cómo se diagnostica?  Esta afección se puede diagnosticar en función de lo siguiente:    [place] antecedentes médicos.  Un examen físico.  Estudios, por ejemplo:    Un procedimiento que produce kitty imagen de los vasos sanguíneos y los órganos cercanos, y además proporciona información sobre el flujo sanguíneo a través de los vasos (ecografía dúplex).  Un procedimiento que estudia el flujo sanguíneo (pletismografía).  Un procedimiento que se realiza para observar las venas por medio de radiografías y tintes (venograma).      ¿Cómo se trata?  ImageLos objetivos del tratamiento son que la persona vuelva a tener kitty dilip activa y minimizar el dolor o la discapacidad. El tratamiento varía en función de la gravedad de la afección y puede incluir lo siguiente:    Usar medias de compresión. Estas pueden ayudar a aliviar los síntomas y a prevenir que la enfermedad empeore. No obstante, no curan la enfermedad.  Escleroterapia. Se trata de un procedimiento que implica la aplicación de kitty inyección con kitty sustancia que "disuelve" las venas dañadas.  Cirugía. [place] puede incluir lo siguiente:    Extraer kitty vena enferma (extirpación de la vena).  Cortar el flujo sanguíneo a través de la vena (cirugía de ablación con láser).  Reparar kitty válvula.      Siga estas indicaciones en madera casa:  Use las medias de compresión paula se lo haya indicado el médico. Estas medias ayudan a evitar la formación de coágulos de erica y a reducir la hinchazón de las piernas.  [place] los medicamentos de venta kaylen y los recetados solamente paula se lo haya indicado el médico.  Manténgase activo al realizar ejercicio, caminar o hacer actividades diferentes. Pregúntele al médico qué actividades son seguras para usted y cuánto ejercicio debe realizar.  [place] suficiente líquido para mantener la orina don o de color amarillo pálido.  No consuma ningún producto que contenga nicotina o tabaco, paula cigarrillos y cigarrillos electrónicos. Si necesita ayuda para dejar de fumar, consulte al médico.  Image ImageConcurra a todas las visitas de control paula se lo haya indicado el médico. [place] es importante.  Comuníquese con un médico si:  Tiene más enrojecimiento, hinchazón o más dolor en la jennifer afectada.  Observa kitty línea tracey que se extiende por arriba o por debajo de la jennifer afectada.  Tiene kitty laceración o pérdida de la piel en la jennifer afectada, aunque sea pequeña.  Se lesiona la jennifer afectada.  Solicite ayuda de inmediato si:  Tiene kitty lesión y kitty herida abierta en la jennifer afectada.  Tiene un dolor intenso que no mejora con medicamentos.  Tiene adormecimiento o debilidad de repente en el pie o el tobillo por debajo de la jennifer afectada, o tiene dificultad para  el pie o el tobillo.  Tiene fiebre y síntomas persistentes o que empeoran.  Siente dolor en el pecho.  Le falta el aire.  Resumen  La insuficiencia venosa crónica, también llamada estasis venosa, es kitty enfermedad que lucas que la erica se bombee de forma eficaz a través de las venas de las piernas.  La insuficiencia venosa crónica ocurre cuando las browning de las venas se estiran, debilitan o dañan, o cuando las válvulas de las venas están dañadas.  El tratamiento de esta afección depende de madera gravedad y puede implicar el uso de medias de compresión o la realización de un procedimiento.  Asegúrese de mantenerse activo al realizar ejercicio, caminar o hacer actividades diferentes. Pregúntele al médico qué actividades son seguras para usted y cuánto ejercicio debe realizar.  Esta información no tiene paula fin reemplazar el consejo del médico. Asegúrese de hacerle al médico cualquier pregunta que tenga.  EnglishSpanish    Celulitis, en adultos  Cellulitis, Adult  ImageLa celulitis es kitty infección de la piel. La jennifer infectada generalmente se pone tracey y causa dolor. Esta afección ocurre con más frecuencia en los brazos y en las piernas. Es importante realizar un tratamiento para esta afección.    Siga estas indicaciones en madera casa:  [place] los medicamentos de venta kaylen y los recetados solamente paula se lo haya indicado el médico.  Si le recetaron un antibiótico, tómelo paula se lo haya indicado el médico. No deje de idalia los antibióticos aunque comience a sentirse mejor.  [place] suficiente líquido para mantener la orina de color amarillo pálido.  No toque ni frote la jennifer infectada.  Cuando esté sentado o acostado, alce (eleve) la jennifer infectada por encima del nivel del corazón.  Colóquese paños húmedos tibios o fríos (compresas tibias o frías) sobre la jennifer infectada. [place]o paula se lo haya indicado el médico.  Concurra a todas las visitas de seguimiento paula se lo haya indicado el médico. [place] es importante. Estas visitas le permiten al médico asegurarse de que la infección no está empeorando.  Comuníquese con un médico si:  Tiene fiebre.  Los síntomas no mejoran después de 1 o 2 días de tratamiento.  El hueso o la articulación que se encuentran debajo de la jennifer infectada empiezan a dolerle después de que la piel se rizwan.  La infección regresa. [place] puede ocurrir en la misma jennifer o en otra.  Tiene un bulto hinchado en la jennifer infectada.  Aparecen nuevos síntomas.  Se siente enfermo y también tiene natalie musculares.  Solicite ayuda de inmediato si:  Los síntomas empeoran.  Se siente muy somnoliento.  Devuelve vomita o tiene deposiciones acuosas (diarrea).  Tiene líneas lanier que salen desde la jennifer infectada.  La jennifer de color catalan se agranda.  La jennifer de color catalan se oscurece.  Esta información no tiene paula fin reemplazar el consejo del médico. Asegúrese de hacerle al médico cualquier pregunta que tenga.

## 2019-04-10 NOTE — ED PROVIDER NOTE - MUSCULOSKELETAL, MLM
Spine appears normal, all extremities grossly intact and range of motion is not limited, tenderness, PVD discolorations, and edema to left LE, distal pulses present, NROM

## 2019-04-10 NOTE — ED PROVIDER NOTE - NSFOLLOWUPCLINICS_GEN_ALL_ED_FT
[facility]  Family Medicine  [facility]  [place], [place] [facility]  Phone: [phone]  Fax:   Follow Up Time:

## 2019-04-10 NOTE — ED ADULT NURSE NOTE - NS PRO PASSIVE SMOKE EXP
Saw pt in interdisciplinarily rounds, plan of care and discharge date/ location discussed. Per the hospitalitis  Plans are to d/c home with spouse on tues 1/15. No

## 2019-04-10 NOTE — ED ADULT TRIAGE NOTE - CHIEF COMPLAINT QUOTE
Patient c/o left ankle swelling and pain since monday got worse today  , was seen in the ER monday for the same reason . Denies any injury .

## 2019-04-10 NOTE — ED PROVIDER NOTE - SKIN, MLM
Skin normal color for race, warm, dry and intact.  erythema and increased warmth to touch to left LE, p[oorely healing woundsw/o any active drainage.

## 2019-04-10 NOTE — ED ADULT NURSE NOTE - OBJECTIVE STATEMENT
34 y/o M c/o L ankle pain x 3 days, worsening since Monday. Was seen in ER for same on Monday. Denies any other medical problems/complaints

## 2019-04-10 NOTE — ED PROVIDER NOTE - CLINICAL SUMMARY MEDICAL DECISION MAKING FREE TEXT BOX
34 yo male in the ER due to Left LE pain, edema erythema, poorly healing wounds to medial aspect of the lower leg. Exam findings consistent with chronic venous insufficiency discolorations and chronic poorly healing ulcers, also erythema and increased warmth to touch, new as per pt, could be signs of beginning of cellulitis. pt ambulatory, has good pulses, no fever, and no evidence of DVT. will d/c home with PO abx and recommend to f/u with PMD and vascular surgeon.

## 2019-04-10 NOTE — ED PROVIDER NOTE - CARE PROVIDER_API CALL
Patricia Jon)  Surgery; Vascular Surgery  [address] [place] [address]  Phone: [phone]  Fax: [phone]  Follow Up Time:

## 2019-08-07 NOTE — ED PROVIDER NOTE - NS_EDPROVIDERDISPOUSERTYPE_ED_A_ED
I have personally evaluated and examined the patient. The Attending was available to me as a supervising provider if needed. Declines

## 2020-09-21 NOTE — ED PROVIDER NOTE - NS_EDPROVIDERDISPOUSERTYPE_ED_A_ED
Efrain Biswas,       Your recent test results are attached, if you have any questions or concerns please feel free to contact me via e-mail or call [phone].  Folate is now normal, continue supplement. Blood is gone from urine so no follow up needed there. Cholesterol is not as bad previous, TGs are elevated at 358 we should recheck them fasting in 2 months. Ferritin level actually has come down so that is great. FSH shows you are NOT post-menopausal. Hemoglobin normal.   I was notified you did not schedule with the dependency specialist, I hope you do change your mind with that.  Please continue with  Gastroenterology. If they do not order liver tests in 1-2 months, then we should repeat them with the cholesterol.          It was a pleasure to see you at your recent office visit.      Sincerely,  Dania Munoz PA-C     I have personally evaluated and examined the patient. The Attending was available to me as a supervising provider if needed.

## 2020-11-06 NOTE — ED PROVIDER NOTE - OBJECTIVE STATEMENT
Spoke with Dr. Jose G Ramos about lung biopsy. Lung biopsy cannot be done today, eliquis has not been held long enough. The earliest it could be done would be Monday as an inpatient or the patient can be done as an outpatient.
Spoke with pt's daughter [facility] and [place] and updated on pt's status.  Will page Dr. Artie Moraes to notify that daughter would like to speak with MD.
34 yo male in the ER c/o left LE edema. Pt was seen here multiple times as he has non-healing wound to his lower leg. Pt was prescribed abx and anti-itch ointment. Pt states that for the past 2 days swelling became worse and he noted his left foot and ankle is much more red with streaks to left calf. Pt denies fever.

## 2021-04-28 NOTE — ED PROVIDER NOTE - CROS ED CONS ALL NEG
negative...
Ears: no ear pain and no hearing problems. Nose: no nasal congestion and no nasal drainage. Mouth/Throat: no dysphagia, no hoarseness and no throat pain. Neck: no lumps, no pain, no stiffness and no swollen glands.

## 2022-02-08 NOTE — ED ADULT NURSE NOTE - NS ED NURSE LEVEL OF CONSCIOUSNESS ORIENTATION
Orders for left ureteroscopy. Please book for next Monday or Friday at end of day, may need to adjust after discussing with dr. Brower on his return. Will need pre-op clearance with PCP and cardio clearance (per patient). Orders in  Oriented - self; Oriented - place; Oriented - time

## 2022-04-23 NOTE — ED ADULT TRIAGE NOTE - BMI (KG/M2)
29.9 Cyclosporine Pregnancy And Lactation Text: This medication is Pregnancy Category C and it isn't know if it is safe during pregnancy. This medication is excreted in breast milk.

## 2022-11-28 NOTE — ED ADULT NURSE NOTE - CADM POA PRESS ULCER
[facility]  Electrodiagnostic Laboratory                 Department of Neurology                                                                                                         Test Date:  2022    Patient: Dali Martines : 1969 Physician: Jarrett Gomez MD   Sex: Female AGE: 53 year Ref Phys: Tolu Fernández DPM   ID#: [id number]   Technician: Kristy Behling     Clinical Information:  53 year old woman with intermittent numbness in the big toe of her left foot. Evaluate for focal neuropathy vs radiculopathy.     Techniques:  Motor and sensory conduction studies were done with surface recording electrodes. EMG was done with a concentric needle electrode.     Results:  Nerve conduction studies:   1. Left sural and superficial peroneal sensory responses are normal.   2. Bilateral medial plantar responses are normal.   3. Left peroneal-EDB and tibial-AH motor responses are normal.     Needle EMG of selected proximal and distal left lower limb muscles was performed as tabulated below. No abnormal spontaneous activity was observed in the sampled muscles. Motor unit potential morphology and recruitment patterns were normal.     Interpretation:  This is a normal study. There is no electrophysiologic evidence of a focal neuropathy or lumbosacral radiculopathy affecting the left lower limb on the basis of this study.     Jarrett Gomez MD  Department of Neurology    Nerve Conduction Studies  Motor Sites      Latency Amplitude Neg. Amp Diff Segment Distance Velocity Neg. Dur Neg Area Diff Temperature Comment   Site (ms) Norm (mV) Norm %  cm m/s Norm ms %  C    Left Fibular (EDB) Motor   Ankle 4.2  < 6.0 3.8  > 2.5  Ankle-EDB 8   9.3  29.6    Bel Fib Head 11.8 - 2.9 - -23.7 Bel Fib Head-Ankle 36 47  > 38 11.1 -15.4 29.6    Pop Fossa 14.1 - 3.1 - 6.9 Pop Fossa-Bel Fib Head 10 43  > 38 9.7 -1.30 29.6    Left Tibial (AHB) Motor   Ankle 3.2  < 6.5 12.8  > 4.4  Ankle-AHB 8   6.8   30.3    Knee 12.8 - 3.5 - -72.7 Knee-Ankle 40 42  > 38 7.0 -64.1 30.4      Sensory Sites      Onset Lat Peak Lat Amp (O-P) Amp (P-P) Segment Distance Velocity Temperature Comment   Site ms ms  V Norm  V  cm m/s Norm  C    Left Medial Plantar (Ortho) Sensory   Great Toe-Med Mall 2.6 3.7 9 - 15 Great Toe-Med Mall - - - 30.1    Right Medial Plantar (Ortho) Sensory   Great Toe-Med Mall 2.5 3.4 5 - 8 Great Toe-Med Mall - - - 31    Left Superficial Fibular Sensory   14 cm-Ankle 2.9 4.0 8  > 3 7 14 cm-Ankle 12.5 43  > 38 27.6    Left Sural Sensory   Calf-Lat Mall 2.3 3.1 11  > 5 11 Calf-Lat Mall 14 61  > 38 29.3      F Wave Studies     Min-F Max-F Dispersion Persistence Mean-F F-Norm L-R Mean-F L-R Mean-F Norm F/M Ratio F-M Lat (ms)   Left Tibial (Abd Hallucis)  30.6  C   50.47 53.28 2.81 100.00 51.72 <61  <5.7 2.01 45.78       Electromyography     Side Muscle Ins Act Fibs/PSW Fasc HF Amp Dur Poly Recrt Int Pat   Left Vastus Lat Nml None Nml 0 Nml Nml 0 Nml Nml   Left Tib Anterior Nml None Nml 0 Nml Nml 0 Nml Nml   Left Fib Longus Nml None Nml 0 Nml Nml 0 Nml Nml   Left Gastroc Nml None Nml 0 Nml Nml 0 Nml Nml   Left Tib Posterior Nml None Nml 0 Nml Nml 0 Nml Nml         NCS Waveforms:    Motor         Sensory                              No

## 2023-08-06 ENCOUNTER — EMERGENCY (EMERGENCY)
Facility: HOSPITAL | Age: 38
LOS: 1 days | Discharge: HOME | End: 2023-08-06
Attending: EMERGENCY MEDICINE | Admitting: EMERGENCY MEDICINE
Payer: COMMERCIAL

## 2023-08-06 VITALS
SYSTOLIC BLOOD PRESSURE: 132 MMHG | RESPIRATION RATE: 18 BRPM | HEART RATE: 74 BPM | TEMPERATURE: 99 F | OXYGEN SATURATION: 96 % | WEIGHT: 199.96 LBS | DIASTOLIC BLOOD PRESSURE: 88 MMHG

## 2023-08-06 VITALS
DIASTOLIC BLOOD PRESSURE: 76 MMHG | TEMPERATURE: 98 F | RESPIRATION RATE: 18 BRPM | SYSTOLIC BLOOD PRESSURE: 128 MMHG | OXYGEN SATURATION: 96 % | HEART RATE: 76 BPM

## 2023-08-06 DIAGNOSIS — R82.998 OTHER ABNORMAL FINDINGS IN URINE: ICD-10-CM

## 2023-08-06 DIAGNOSIS — M54.50 LOW BACK PAIN, UNSPECIFIED: ICD-10-CM

## 2023-08-06 LAB
ALBUMIN SERPL ELPH-MCNC: 3.9 G/DL — SIGNIFICANT CHANGE UP (ref 3.3–5)
ALP SERPL-CCNC: 87 U/L — SIGNIFICANT CHANGE UP (ref 40–120)
ALT FLD-CCNC: 44 U/L — SIGNIFICANT CHANGE UP (ref 10–45)
ANION GAP SERPL CALC-SCNC: 10 MMOL/L — SIGNIFICANT CHANGE UP (ref 5–17)
APPEARANCE UR: CLEAR — SIGNIFICANT CHANGE UP
AST SERPL-CCNC: 31 U/L — SIGNIFICANT CHANGE UP (ref 10–40)
BACTERIA # UR AUTO: SIGNIFICANT CHANGE UP /HPF
BILIRUB SERPL-MCNC: 0.4 MG/DL — SIGNIFICANT CHANGE UP (ref 0.2–1.2)
BILIRUB UR-MCNC: NEGATIVE — SIGNIFICANT CHANGE UP
BUN SERPL-MCNC: 13 MG/DL — SIGNIFICANT CHANGE UP (ref 7–23)
CALCIUM SERPL-MCNC: 8.7 MG/DL — SIGNIFICANT CHANGE UP (ref 8.4–10.5)
CHLORIDE SERPL-SCNC: 108 MMOL/L — SIGNIFICANT CHANGE UP (ref 96–108)
CK SERPL-CCNC: 143 U/L — SIGNIFICANT CHANGE UP (ref 30–200)
CO2 SERPL-SCNC: 23 MMOL/L — SIGNIFICANT CHANGE UP (ref 22–31)
COLOR SPEC: YELLOW — SIGNIFICANT CHANGE UP
CREAT SERPL-MCNC: 0.83 MG/DL — SIGNIFICANT CHANGE UP (ref 0.5–1.3)
DIFF PNL FLD: NEGATIVE — SIGNIFICANT CHANGE UP
EGFR: 115 ML/MIN/1.73M2 — SIGNIFICANT CHANGE UP
EPI CELLS # UR: SIGNIFICANT CHANGE UP /HPF (ref 0–5)
GLUCOSE SERPL-MCNC: 96 MG/DL — SIGNIFICANT CHANGE UP (ref 70–99)
GLUCOSE UR QL: NEGATIVE — SIGNIFICANT CHANGE UP
KETONES UR-MCNC: NEGATIVE — SIGNIFICANT CHANGE UP
LEUKOCYTE ESTERASE UR-ACNC: NEGATIVE — SIGNIFICANT CHANGE UP
NITRITE UR-MCNC: NEGATIVE — SIGNIFICANT CHANGE UP
PH UR: 6 — SIGNIFICANT CHANGE UP (ref 5–8)
POTASSIUM SERPL-MCNC: SIGNIFICANT CHANGE UP MMOL/L (ref 3.5–5.3)
POTASSIUM SERPL-SCNC: SIGNIFICANT CHANGE UP MMOL/L (ref 3.5–5.3)
PROT SERPL-MCNC: 6.9 G/DL — SIGNIFICANT CHANGE UP (ref 6–8.3)
PROT UR-MCNC: 30 MG/DL
RBC CASTS # UR COMP ASSIST: < 5 /HPF — SIGNIFICANT CHANGE UP
SODIUM SERPL-SCNC: 141 MMOL/L — SIGNIFICANT CHANGE UP (ref 135–145)
SP GR SPEC: >=1.03 — SIGNIFICANT CHANGE UP (ref 1–1.03)
UROBILINOGEN FLD QL: 0.2 E.U./DL — SIGNIFICANT CHANGE UP
WBC UR QL: < 5 /HPF — SIGNIFICANT CHANGE UP

## 2023-08-06 PROCEDURE — 76770 US EXAM ABDO BACK WALL COMP: CPT | Mod: 26

## 2023-08-06 PROCEDURE — 76770 US EXAM ABDO BACK WALL COMP: CPT

## 2023-08-06 PROCEDURE — 36415 COLL VENOUS BLD VENIPUNCTURE: CPT

## 2023-08-06 PROCEDURE — 87086 URINE CULTURE/COLONY COUNT: CPT

## 2023-08-06 PROCEDURE — 96360 HYDRATION IV INFUSION INIT: CPT

## 2023-08-06 PROCEDURE — 96361 HYDRATE IV INFUSION ADD-ON: CPT

## 2023-08-06 PROCEDURE — 80053 COMPREHEN METABOLIC PANEL: CPT

## 2023-08-06 PROCEDURE — 99284 EMERGENCY DEPT VISIT MOD MDM: CPT

## 2023-08-06 PROCEDURE — 99284 EMERGENCY DEPT VISIT MOD MDM: CPT | Mod: 25

## 2023-08-06 PROCEDURE — 82550 ASSAY OF CK (CPK): CPT

## 2023-08-06 PROCEDURE — 81001 URINALYSIS AUTO W/SCOPE: CPT

## 2023-08-06 PROCEDURE — 87591 N.GONORRHOEAE DNA AMP PROB: CPT

## 2023-08-06 PROCEDURE — 87491 CHLMYD TRACH DNA AMP PROBE: CPT

## 2023-08-06 RX ORDER — SODIUM CHLORIDE 9 MG/ML
1000 INJECTION INTRAMUSCULAR; INTRAVENOUS; SUBCUTANEOUS ONCE
Refills: 0 | Status: COMPLETED | OUTPATIENT
Start: 2023-08-06 | End: 2023-08-06

## 2023-08-06 RX ADMIN — SODIUM CHLORIDE 1000 MILLILITER(S): 9 INJECTION INTRAMUSCULAR; INTRAVENOUS; SUBCUTANEOUS at 12:28

## 2023-08-06 RX ADMIN — SODIUM CHLORIDE 1000 MILLILITER(S): 9 INJECTION INTRAMUSCULAR; INTRAVENOUS; SUBCUTANEOUS at 15:00

## 2023-08-06 NOTE — ED PROVIDER NOTE - PATIENT PORTAL LINK FT
You can access the FollowMyHealth Patient Portal offered by [facility] by registering at the following website: [email or web address]. By joining [employer]’s FollowMyHealth portal, you will also be able to view your health information using other applications (apps) compatible with our system.

## 2023-08-06 NOTE — ED PROVIDER NOTE - CLINICAL SUMMARY MEDICAL DECISION MAKING FREE TEXT BOX
36 y/o M, c/o of dark colored urine that he was concerned for hematuria, although no maureen blood seen. Reddish orange tinge seen in past 3 days. Pt's urine in ED w/ no microscopic blood or evidence of infection. Unclear if pt has real hematuria. Suspect possible dehydration as cause of concentrated urine. Pt's mild back pain is not typical of kidney stone pain and more typical of MSK pain. Offered to immediately dc for f/u if sxs reoccur. However, pt preferred some further workup. Labs ordered to eval for kidney function and US to eval for evidence of kidney stones. Dispo pending, workup and reeval.

## 2023-08-06 NOTE — ED ADULT NURSE REASSESSMENT NOTE - NS ED NURSE REASSESS COMMENT FT1
Patient aoX4, c/o of dysuria and hematuria, no abdominal or flank pain.  Vital signs stable.  Right hand PIV #20 in place, all labs sent, no complications.  NSS 1L bolus ongoing, tolerating well.  Brought to US in stable condition.

## 2023-08-06 NOTE — ED PROVIDER NOTE - PHYSICAL EXAMINATION
VITAL SIGNS: I have reviewed nursing notes and confirm.  CONSTITUTIONAL: Well-developed; well-nourished; in no acute distress.  SKIN: Agree with RN documentation regarding decubitus evaluation. Remainder of skin exam is warm and dry, no acute rash.  HEAD: Normocephalic; atraumatic.  EYES: PERRL, EOM intact; conjunctiva and sclera clear.  ENT: No nasal discharge; airway clear.  NECK: Supple; non tender.  CARD: S1, S2 normal; no murmurs, gallops, or rubs. Regular rate and rhythm.  RESP: No wheezes, rales or rhonchi.  ABD: Normal bowel sounds; soft; non-distended; non-tender; no hepatosplenomegaly. No CVAT.   EXT: Normal ROM. No clubbing, cyanosis or edema.  NEURO: Alert, oriented. Grossly unremarkable.  PSYCH: Cooperative, appropriate.

## 2023-08-06 NOTE — ED PROVIDER NOTE - OBJECTIVE STATEMENT
38 y/o M, reports coming in with concern of hematuria, does not see bright red blood or clots, now coming in today, reports urine was reddish orange for 3 days and was concerned it may be blood. Pt denies dysuria, urgency, frequency, discharge, abdominal pain, and fever. No hx of kidney stones. Pt asked about back pain and he reports mild midline lumbar discomfort, worse w/ sitting for few days. Not currently felt. No flank pain, radiation of pain, numbness, and weakness. Pt currently feels well and is asymptomatic.

## 2023-08-06 NOTE — ED PROVIDER NOTE - NSFOLLOWUPINSTRUCTIONS_ED_ALL_ED_FT
Your urine test did not show blood today    follow up with your primary care doctor.     return for concerning symptoms such as blood in urine, increasing pain, fever,

## 2023-08-06 NOTE — ED ADULT TRIAGE NOTE - LANGUAGE ASSISTANCE NEEDED
Yes-Patient/Caregiver accepts free interpretation services... O-L Flap Text: The defect edges were debeveled with a #15 scalpel blade.  Given the location of the defect, shape of the defect and the proximity to free margins an O-L flap was deemed most appropriate.  Using a sterile surgical marker, an appropriate advancement flap was drawn incorporating the defect and placing the expected incisions within the relaxed skin tension lines where possible.    The area thus outlined was incised deep to adipose tissue with a #15 scalpel blade.  The skin margins were undermined to an appropriate distance in all directions utilizing iris scissors.

## 2023-08-07 LAB
C TRACH RRNA SPEC QL NAA+PROBE: SIGNIFICANT CHANGE UP
CULTURE RESULTS: NO GROWTH — SIGNIFICANT CHANGE UP
N GONORRHOEA RRNA SPEC QL NAA+PROBE: SIGNIFICANT CHANGE UP
SPECIMEN SOURCE: SIGNIFICANT CHANGE UP
SPECIMEN SOURCE: SIGNIFICANT CHANGE UP

## 2024-03-31 ENCOUNTER — EMERGENCY (EMERGENCY)
Facility: HOSPITAL | Age: 39
LOS: 1 days | Discharge: HOME | End: 2024-03-31
Admitting: EMERGENCY MEDICINE
Payer: COMMERCIAL

## 2024-03-31 VITALS
HEART RATE: 78 BPM | TEMPERATURE: 98 F | WEIGHT: 190.04 LBS | OXYGEN SATURATION: 96 % | SYSTOLIC BLOOD PRESSURE: 151 MMHG | HEIGHT: 66 IN | RESPIRATION RATE: 16 BRPM | DIASTOLIC BLOOD PRESSURE: 90 MMHG

## 2024-03-31 PROCEDURE — 99283 EMERGENCY DEPT VISIT LOW MDM: CPT

## 2024-03-31 PROCEDURE — 99284 EMERGENCY DEPT VISIT MOD MDM: CPT

## 2024-03-31 RX ORDER — MUPIROCIN 20 MG/G
1 OINTMENT TOPICAL ONCE
Refills: 0 | Status: COMPLETED | OUTPATIENT
Start: 2024-03-31 | End: 2024-03-31

## 2024-03-31 RX ADMIN — MUPIROCIN 1 APPLICATION(S): 20 OINTMENT TOPICAL at 16:47

## 2024-03-31 NOTE — ED ADULT TRIAGE NOTE - WEIGHT IN LBS
190 Gabapentin Pregnancy And Lactation Text: This medication is Pregnancy Category C and isn't considered safe during pregnancy. It is excreted in breast milk.

## 2024-03-31 NOTE — ED PROVIDER NOTE - PHYSICAL EXAMINATION
CONSTITUTIONAL: Well-appearing;  in no apparent distress.   HEAD: Normocephalic; atraumatic.   EYES: PERRL; EOM intact; conjunctiva and sclera clear  SKIN: L hand- 3x3 cm scabbed area with yellow crusting. no surrounding erythema no purulent drainage

## 2024-03-31 NOTE — ED ADULT NURSE NOTE - OBJECTIVE STATEMENT
Pt A&Ox4 and able to speak in complete sentences. Pt breathing even and unlabored with equal chest rise and fall. Pt arrived d/t left hand pain and discomfort d/t burn on left hand over a month ago. Pt has notable rash on radial side of hand w/ scaly patches and pt endorsed clear pus drainage. Pt denies acute pain, fevers, chills, n, v, lightheadedness, dizziness, sob.

## 2024-03-31 NOTE — ED PROVIDER NOTE - OBJECTIVE STATEMENT
37 yo M with no pmh c/o itching to L hand. Pt states 1 month ago he burned his hand on a gas stove. Initially it was red, then it blister and now it has scabbed. pt states it is very itchy. Denies fever, chills, swelling, drainage.

## 2024-03-31 NOTE — ED PROVIDER NOTE - CLINICAL SUMMARY MEDICAL DECISION MAKING FREE TEXT BOX
37 yo M with no pmh c/o itching to L hand. Pt states 1 month ago he burned his hand on a gas stove. Initially it was red, then it blister and now it has scabbed. pt states it is very itchy. Denies fever, chills, swelling, drainage. L hand- 3x3 cm scabbed area with yellow crusting. no surrounding erythema no purulent drainage. will give Bactroban ointment

## 2024-03-31 NOTE — ED PROVIDER NOTE - NSFOLLOWUPINSTRUCTIONS_ED_ALL_ED_FT
Aplique ungüento de mupirocina 3 veces al día kasey 1 semana. Mary Anne un seguimiento con un médico de atención primaria. No te rasques. Regrese al servicio de urgencias si el dolor empeora, fiebre, escalofríos, enrojecimiento, hinchazón, secreción o cualquier otro síntoma preocupante.

## 2024-03-31 NOTE — ED ADULT TRIAGE NOTE - CHIEF COMPLAINT QUOTE
Pt presents to ED C/O L hand pain and itching S/P burn x 1 month ago. Pt states, " I'm worried my hand is infected". Denies fevers.

## 2024-03-31 NOTE — ED PROVIDER NOTE - PATIENT PORTAL LINK FT
You can access the FollowMyHealth Patient Portal offered by [facility] by registering at the following website: [email or web address]. By joining [email or web address]’s FollowMyHealth portal, you will also be able to view your health information using other applications (apps) compatible with our system.

## 2024-04-01 DIAGNOSIS — S61.402A UNSPECIFIED OPEN WOUND OF LEFT HAND, INITIAL ENCOUNTER: ICD-10-CM
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
## 2024-04-01 DIAGNOSIS — L29.8 OTHER PRURITUS: ICD-10-CM

## 2025-02-06 ENCOUNTER — EMERGENCY (EMERGENCY)
Facility: HOSPITAL | Age: 40
LOS: 1 days | Discharge: HOME | End: 2025-02-06
Attending: STUDENT IN AN ORGANIZED HEALTH CARE EDUCATION/TRAINING PROGRAM | Admitting: STUDENT IN AN ORGANIZED HEALTH CARE EDUCATION/TRAINING PROGRAM
Payer: COMMERCIAL

## 2025-02-06 VITALS
WEIGHT: 199.96 LBS | DIASTOLIC BLOOD PRESSURE: 93 MMHG | SYSTOLIC BLOOD PRESSURE: 153 MMHG | HEIGHT: 74.8 IN | RESPIRATION RATE: 20 BRPM | OXYGEN SATURATION: 97 % | TEMPERATURE: 98 F | HEART RATE: 105 BPM

## 2025-02-06 VITALS
HEART RATE: 82 BPM | OXYGEN SATURATION: 99 % | RESPIRATION RATE: 18 BRPM | SYSTOLIC BLOOD PRESSURE: 107 MMHG | DIASTOLIC BLOOD PRESSURE: 71 MMHG | TEMPERATURE: 99 F

## 2025-02-06 LAB
APPEARANCE UR: CLEAR — SIGNIFICANT CHANGE UP
BILIRUB UR-MCNC: NEGATIVE — SIGNIFICANT CHANGE UP
COLOR SPEC: YELLOW — SIGNIFICANT CHANGE UP
DIFF PNL FLD: NEGATIVE — SIGNIFICANT CHANGE UP
FLUAV AG NPH QL: SIGNIFICANT CHANGE UP
FLUBV AG NPH QL: SIGNIFICANT CHANGE UP
GLUCOSE UR QL: NEGATIVE MG/DL — SIGNIFICANT CHANGE UP
HCG UR QL: NEGATIVE — SIGNIFICANT CHANGE UP
KETONES UR-MCNC: NEGATIVE MG/DL — SIGNIFICANT CHANGE UP
LEUKOCYTE ESTERASE UR-ACNC: NEGATIVE — SIGNIFICANT CHANGE UP
NITRITE UR-MCNC: NEGATIVE — SIGNIFICANT CHANGE UP
PH UR: 6 — SIGNIFICANT CHANGE UP (ref 5–8)
PROT UR-MCNC: 100 MG/DL
RSV RNA NPH QL NAA+NON-PROBE: SIGNIFICANT CHANGE UP
SARS-COV-2 RNA SPEC QL NAA+PROBE: SIGNIFICANT CHANGE UP
SP GR SPEC: 1.03 — SIGNIFICANT CHANGE UP (ref 1–1.03)
UROBILINOGEN FLD QL: 1 MG/DL — SIGNIFICANT CHANGE UP (ref 0.2–1)

## 2025-02-06 PROCEDURE — 81025 URINE PREGNANCY TEST: CPT

## 2025-02-06 PROCEDURE — 81001 URINALYSIS AUTO W/SCOPE: CPT

## 2025-02-06 PROCEDURE — 87491 CHLMYD TRACH DNA AMP PROBE: CPT

## 2025-02-06 PROCEDURE — 96372 THER/PROPH/DIAG INJ SC/IM: CPT

## 2025-02-06 PROCEDURE — 87591 N.GONORRHOEAE DNA AMP PROB: CPT

## 2025-02-06 PROCEDURE — 99283 EMERGENCY DEPT VISIT LOW MDM: CPT | Mod: 25

## 2025-02-06 PROCEDURE — 99284 EMERGENCY DEPT VISIT MOD MDM: CPT

## 2025-02-06 PROCEDURE — 87637 SARSCOV2&INF A&B&RSV AMP PRB: CPT

## 2025-02-06 RX ORDER — CEFTRIAXONE 250 MG/1
500 INJECTION, POWDER, FOR SOLUTION INTRAMUSCULAR; INTRAVENOUS ONCE
Refills: 0 | Status: COMPLETED | OUTPATIENT
Start: 2025-02-06 | End: 2025-02-06

## 2025-02-06 RX ORDER — DOXYCYCLINE HYCLATE 100 MG/1
100 CAPSULE ORAL ONCE
Refills: 0 | Status: COMPLETED | OUTPATIENT
Start: 2025-02-06 | End: 2025-02-06

## 2025-02-06 RX ORDER — DOXYCYCLINE HYCLATE 100 MG/1
1 CAPSULE ORAL
Qty: 14 | Refills: 0
Start: 2025-02-06 | End: 2025-02-12

## 2025-02-06 RX ADMIN — CEFTRIAXONE 500 MILLIGRAM(S): 250 INJECTION, POWDER, FOR SOLUTION INTRAMUSCULAR; INTRAVENOUS at 20:54

## 2025-02-06 RX ADMIN — DOXYCYCLINE HYCLATE 100 MILLIGRAM(S): 100 CAPSULE ORAL at 20:54

## 2025-02-06 NOTE — ED PROVIDER NOTE - CLINICAL SUMMARY MEDICAL DECISION MAKING FREE TEXT BOX
39M no sig PMH here for dysuria and right flank pain x 2 days. Also with subjective fevers. No meds taken PTA. No abnl penile discharge. No sexually transmitted infxn hx. Monogamous with partner.    UA without obvs infxn, however given sxs, will treat. Will also treat prophylactically for gc chlamydia - ceftriaxone now and doxycycline

## 2025-02-06 NOTE — ED PROVIDER NOTE - OBJECTIVE STATEMENT
39M no sig PMH here for dysuria and right flank pain x 2 days. Also with subjective fevers. No meds taken PTA. No abnl penile discharge. No sexually transmitted infxn hx. Monogamous with partner.

## 2025-02-06 NOTE — ED ADULT NURSE NOTE - CHIEF COMPLAINT QUOTE
pt reports having two days of fever, painful urination and lower back pain. pt is afebrile in triage, denies taking any medications today

## 2025-02-06 NOTE — ED PROVIDER NOTE - PATIENT PORTAL LINK FT
You can access the FollowMyHealth Patient Portal offered by [facility] by registering at the following website: [email or web address]. By joining [employer]’s FollowMyHealth portal, you will also be able to view your health information using other applications (apps) compatible with our system. 10-Sep-2018 11:40

## 2025-02-06 NOTE — ED ADULT NURSE NOTE - OBJECTIVE STATEMENT
38 yo M, unsure of his PMHx, presents to the ED co fevers, low back pain, painful urination x 2 days. Pt awake and alert, AOx4. Pt denies falls / injuries. pt denies numbness / tingling. pt denies loss of control of bowels / bladder. pt reports lightheadedness. Pt denies N/V, CP, SOB. [facility]  used: [id number]

## 2025-02-06 NOTE — ED ADULT NURSE NOTE - NSFALLUNIVINTERV_ED_ALL_ED
Bed/Stretcher in lowest position, wheels locked, appropriate side rails in place/Call bell, personal items and telephone in reach/Instruct patient to call for assistance before getting out of bed/chair/stretcher/Non-slip footwear applied when patient is off stretcher/[place] to call system/Physically safe environment - no spills, clutter or unnecessary equipment/Purposeful proactive rounding/Room/bathroom lighting operational, light cord in reach

## 2025-02-06 NOTE — ED ADULT TRIAGE NOTE - CHIEF COMPLAINT QUOTE
pt reports having two days of fever, painful urination and lower back pain pt reports having two days of fever, painful urination and lower back pain. pt is afebrile in triage, denies taking any medications today

## 2025-02-07 LAB
C TRACH RRNA SPEC QL NAA+PROBE: SIGNIFICANT CHANGE UP
N GONORRHOEA RRNA SPEC QL NAA+PROBE: SIGNIFICANT CHANGE UP

## 2025-02-10 DIAGNOSIS — R30.0 DYSURIA: ICD-10-CM

## 2025-02-10 DIAGNOSIS — R50.9 FEVER, UNSPECIFIED: ICD-10-CM

## 2025-02-10 DIAGNOSIS — R10.9 UNSPECIFIED ABDOMINAL PAIN: ICD-10-CM

## 2025-03-19 NOTE — ED ADULT TRIAGE NOTE - ESI TRIAGE ACUITY LEVEL, MLM
[facility]                                Progress note    Subjective:  Denies chest pain and dyspnea     Objective:  Sitting up in bed, conversing without difficulty  No acute distress  Wife present at the bedside  PT OT present at the bedside  /81   Pulse 78   Temp 97.9 °F (36.6 °C)   Resp 16   Ht 1.676 m (5' 6\")   Wt 86.7 kg (191 lb 1.8 oz)   SpO2 100%   BMI 30.85 kg/m²   CONST:  Well developed, obese, elderly  male who appears stated age. Awake, alert, cooperative, no apparent distress  HEENT:   Head- Normocephalic, atraumatic   Eyes- Conjunctivae pink, anicteric  Throat- Oral mucosa pink and moist  Neck-  No stridor, trachea midline, no jugular venous distention. No adenopathy   CHEST: Chest symmetrical and non-tender to palpation. No accessory muscle use or intercostal retractions  RESPIRATORY: Lung sounds -diminished throughout fields   CARDIOVASCULAR:     No carotid bruit  Heart Inspection- shows no noted pulsations  Heart Palpation- no heaves or thrills; PMI is non-displaced   Heart Ausculation- Regular rate and rhythm, no murmur. No s3, s4 or rub   PV: 1-2+ pitting bilateral lower extremity edema. No varicosities. Pedal pulses palpable, no clubbing or cyanosis.  LUE 1+ pitting edema  ABDOMEN: Soft, obese, non-tender to light palpation. Bowel sounds present. No palpable masses no organomegaly; no abdominal bruit  MS: Good muscle strength and tone. No atrophy or abnormal movements.   : Deferred  SKIN: Warm and dry no statis dermatitis or ulcers   NEURO / PSYCH: Oriented to person, place and time. Speech clear and appropriate. Follows all commands. Pleasant affect      Recent Labs     03/17/25 0600 03/18/25  0600 03/19/25  0630   WBC 9.6 10.0 11.6*   HGB 9.3* 9.4* 9.8*   HCT 29.0* 29.7* 30.5*    173 201       Recent Labs     03/17/25  0600 03/18/25  0600 03/19/25  0630    142 143   K 3.5 4.1 3.4*    106 102   CO2 25 23 29   BUN 18 14 15  stable, continue to monitor  Anemia, stable  Hypocalcemia, supplement  Hypokalemia, supplement  Supplement potassium  Monitor CBC, BMP  LUE edema, ultrasound negative for DVT  Acute on chronic HFrEF  Monitor daily weight, I&O, BMP, NT proBNP  NT proBNP 29,733 (03/18)  Continue Demadex 40 mg PO daily per Cardiology  Known Hx Ischemic Cardiomyopathy s/p [employer] CRT-D  Entresto initiated per Cardiology, continue with holding parameters in view of BP  Known Hx moderate MR, moderate TR  Discharge planning to facility when able, pre-CERT initiated      Code status: FULL  Consultants:  Pulmonology, Infectious Disease, General Surgery, Cardiology  DVT Prophylaxis Eliquis  PT/OT  Discharge planning           Marylin Bone, KAYE - CNP,  10:36 AM  3/19/2025   4

## 2025-08-18 ENCOUNTER — EMERGENCY (EMERGENCY)
Facility: HOSPITAL | Age: 40
LOS: 1 days | End: 2025-08-18
Attending: STUDENT IN AN ORGANIZED HEALTH CARE EDUCATION/TRAINING PROGRAM | Admitting: STUDENT IN AN ORGANIZED HEALTH CARE EDUCATION/TRAINING PROGRAM
Payer: COMMERCIAL

## 2025-08-18 VITALS
DIASTOLIC BLOOD PRESSURE: 90 MMHG | OXYGEN SATURATION: 98 % | HEART RATE: 774 BPM | RESPIRATION RATE: 18 BRPM | TEMPERATURE: 99 F | SYSTOLIC BLOOD PRESSURE: 138 MMHG

## 2025-08-18 LAB
ANION GAP SERPL CALC-SCNC: 8 MMOL/L — SIGNIFICANT CHANGE UP (ref 5–17)
BASOPHILS # BLD AUTO: 0.02 K/UL — SIGNIFICANT CHANGE UP (ref 0–0.2)
BASOPHILS NFR BLD AUTO: 0.3 % — SIGNIFICANT CHANGE UP (ref 0–2)
BUN SERPL-MCNC: 16 MG/DL — SIGNIFICANT CHANGE UP (ref 7–23)
CALCIUM SERPL-MCNC: 8.9 MG/DL — SIGNIFICANT CHANGE UP (ref 8.4–10.5)
CHLORIDE SERPL-SCNC: 104 MMOL/L — SIGNIFICANT CHANGE UP (ref 96–108)
CO2 SERPL-SCNC: 25 MMOL/L — SIGNIFICANT CHANGE UP (ref 22–31)
CREAT SERPL-MCNC: 1.03 MG/DL — SIGNIFICANT CHANGE UP (ref 0.5–1.3)
EGFR: 94 ML/MIN/1.73M2 — SIGNIFICANT CHANGE UP
EGFR: 94 ML/MIN/1.73M2 — SIGNIFICANT CHANGE UP
EOSINOPHIL # BLD AUTO: 0.11 K/UL — SIGNIFICANT CHANGE UP (ref 0–0.5)
EOSINOPHIL NFR BLD AUTO: 1.8 % — SIGNIFICANT CHANGE UP (ref 0–6)
GLUCOSE SERPL-MCNC: 113 MG/DL — HIGH (ref 70–99)
HCT VFR BLD CALC: 42.3 % — SIGNIFICANT CHANGE UP (ref 39–50)
HGB BLD-MCNC: 15 G/DL — SIGNIFICANT CHANGE UP (ref 13–17)
IMM GRANULOCYTES # BLD AUTO: 0.01 K/UL — SIGNIFICANT CHANGE UP (ref 0–0.07)
IMM GRANULOCYTES NFR BLD AUTO: 0.2 % — SIGNIFICANT CHANGE UP (ref 0–0.9)
LYMPHOCYTES # BLD AUTO: 2.32 K/UL — SIGNIFICANT CHANGE UP (ref 1–3.3)
LYMPHOCYTES NFR BLD AUTO: 39 % — SIGNIFICANT CHANGE UP (ref 13–44)
MCHC RBC-ENTMCNC: 33.1 PG — SIGNIFICANT CHANGE UP (ref 27–34)
MCHC RBC-ENTMCNC: 35.5 G/DL — SIGNIFICANT CHANGE UP (ref 32–36)
MCV RBC AUTO: 93.4 FL — SIGNIFICANT CHANGE UP (ref 80–100)
MONOCYTES # BLD AUTO: 0.43 K/UL — SIGNIFICANT CHANGE UP (ref 0–0.9)
MONOCYTES NFR BLD AUTO: 7.2 % — SIGNIFICANT CHANGE UP (ref 2–14)
NEUTROPHILS # BLD AUTO: 3.06 K/UL — SIGNIFICANT CHANGE UP (ref 1.8–7.4)
NEUTROPHILS NFR BLD AUTO: 51.5 % — SIGNIFICANT CHANGE UP (ref 43–77)
NRBC # BLD AUTO: 0 K/UL — SIGNIFICANT CHANGE UP (ref 0–0)
NRBC # FLD: 0 K/UL — SIGNIFICANT CHANGE UP (ref 0–0)
NRBC BLD AUTO-RTO: 0 /100 WBCS — SIGNIFICANT CHANGE UP (ref 0–0)
PLATELET # BLD AUTO: 196 K/UL — SIGNIFICANT CHANGE UP (ref 150–400)
PMV BLD: 10.1 FL — SIGNIFICANT CHANGE UP (ref 7–13)
POTASSIUM SERPL-MCNC: 3.8 MMOL/L — SIGNIFICANT CHANGE UP (ref 3.5–5.3)
POTASSIUM SERPL-SCNC: 3.8 MMOL/L — SIGNIFICANT CHANGE UP (ref 3.5–5.3)
RBC # BLD: 4.53 M/UL — SIGNIFICANT CHANGE UP (ref 4.2–5.8)
RBC # FLD: 13.1 % — SIGNIFICANT CHANGE UP (ref 10.3–14.5)
SODIUM SERPL-SCNC: 137 MMOL/L — SIGNIFICANT CHANGE UP (ref 135–145)
WBC # BLD: 5.95 K/UL — SIGNIFICANT CHANGE UP (ref 3.8–10.5)
WBC # FLD AUTO: 5.95 K/UL — SIGNIFICANT CHANGE UP (ref 3.8–10.5)

## 2025-08-18 PROCEDURE — 99284 EMERGENCY DEPT VISIT MOD MDM: CPT

## 2025-08-18 PROCEDURE — 80048 BASIC METABOLIC PNL TOTAL CA: CPT

## 2025-08-18 PROCEDURE — 82962 GLUCOSE BLOOD TEST: CPT

## 2025-08-18 PROCEDURE — 36415 COLL VENOUS BLD VENIPUNCTURE: CPT

## 2025-08-18 PROCEDURE — 99283 EMERGENCY DEPT VISIT LOW MDM: CPT

## 2025-08-18 PROCEDURE — 85025 COMPLETE CBC W/AUTO DIFF WBC: CPT

## 2025-08-18 RX ADMIN — Medication 1000 MILLILITER(S): at 19:52

## 2025-08-21 DIAGNOSIS — R51.9 HEADACHE, UNSPECIFIED: ICD-10-CM

## 2025-08-21 DIAGNOSIS — R42 DIZZINESS AND GIDDINESS: ICD-10-CM

## 2025-08-21 DIAGNOSIS — R53.83 OTHER FATIGUE: ICD-10-CM
